# Patient Record
Sex: FEMALE | Race: WHITE | Employment: FULL TIME | URBAN - METROPOLITAN AREA
[De-identification: names, ages, dates, MRNs, and addresses within clinical notes are randomized per-mention and may not be internally consistent; named-entity substitution may affect disease eponyms.]

---

## 2017-01-09 ENCOUNTER — OFFICE VISIT (OUTPATIENT)
Dept: FAMILY MEDICINE CLINIC | Age: 27
End: 2017-01-09

## 2017-01-09 VITALS
HEIGHT: 69 IN | SYSTOLIC BLOOD PRESSURE: 122 MMHG | TEMPERATURE: 97.6 F | BODY MASS INDEX: 39.81 KG/M2 | RESPIRATION RATE: 17 BRPM | DIASTOLIC BLOOD PRESSURE: 83 MMHG | HEART RATE: 78 BPM | OXYGEN SATURATION: 99 % | WEIGHT: 268.8 LBS

## 2017-01-09 DIAGNOSIS — G43.809 OTHER MIGRAINE WITHOUT STATUS MIGRAINOSUS, NOT INTRACTABLE: Chronic | ICD-10-CM

## 2017-01-09 DIAGNOSIS — J18.9 PNEUMONIA OF LEFT UPPER LOBE DUE TO INFECTIOUS ORGANISM: Primary | ICD-10-CM

## 2017-01-09 RX ORDER — SUMATRIPTAN 100 MG/1
TABLET, FILM COATED ORAL
Qty: 12 TAB | Refills: 5 | Status: SHIPPED | OUTPATIENT
Start: 2017-01-09 | End: 2017-10-18 | Stop reason: SDUPTHER

## 2017-01-09 NOTE — PROGRESS NOTES
PRE-VISIT PLANNING:     Future Appointments  Date Time Provider Barb Harris   2017 2:30 PM Idalmis Chavarria  [facility] Jorge Son is a patient of Idalmis Chavarria MD who is scheduled for an office visit with Idalmis Chavarria MD  for follow up on possible pneumonia, noted on CXR 16 with left upper lobe nodules suspicious for developing PNA. Encounter opened prior to visit to complete pre-visit planning, update and review pertinent sections in the chart. Last office visit with PCP was 2016. Internal Medicine/Primary Care  Progress Note  [employer] [facility] [place] [address] [address], [address], [address] [facility] [place]  Phone [phone]  Fax [phone]    Date of Service:  2017  Patient's Name: Concetta Eid   Patient's :  1990     History, Discussion & Plan     Chief Complaint   Patient presents with    Pneumonia     Follow up        Concetta Eid is a pleasant 32 y.o. female patient who was seen today for follow up visit. She  has a past medical history of BMI 33.0-33.9,adult (14); Depression; H/O echocardiogram (14); Headache(784.0); seizure disorder (18 month old); Left facial numbness (2014); Obstructive sleep apnea (adult) (pediatric); and Posttraumatic stress disorder. Here today with her  and son. Doing better, breathing is back to baseline, cough is mostly resolved. Reviewed recent CXR results and discussed plan for updating PPSV23 in 5 years.       Review of Systems (positives in bold)   CONST:   fevers, chills, rigors, malaise, night sweats, fatigue    unintentional weight change, appetite change  NEURO:   headaches, auras, vision changes, seizures,     dizziness, lightheadeness, orthostasis, loss of consciousness, confusion    numbness/tingling/sensory change, focal weakness, new gait difficulty/imbalance  HEENT:  itchy eyes, runny eyes, red eyes, eye watering or discharge, vision changes, light sensitivity, double vision    ear pain, ear pressure/popping, ear discharge/drainage, hearing change    sneezing, runny nose, nasal congestion, postnasal drip,     nosebleeds, altered sense of smell    sore throat, dry mouth,voice change, hoarse voice,      jaw pain, jaw popping, toothache, dysgeusia    difficulty swallowing, oral ulcers or canker sores  CV:      chest pain, palpitations, chest wall pain, orthopnea, PND, edema  PULM:  SOB, wheezing, cough, sputum production, hemoptysis, recurrent pneumonia    Body mass index is 39.69 kg/(m^2). Discussed the patient's above normal BMI with her. I have recommended the following interventions and follow up:  Weight loss from baseline weight. 30 minutes of cardiovascular exercise daily, reduction in simple carbohydrates, increase in dietary fiber, adequate water intake to stay hydrated/keep urine clear to light yellow. Follow up at next OV. Diagnoses & Orders:   Jocelyne Barajas was seen today for follow up. Plan to update CXR in several weeks to eval for clearance. Imitrex for migraine type headaches. ICD-10-CM ICD-9-CM    1. Pneumonia of left upper lobe due to infectious organism J18.9 483.8 XR CHEST PA LAT   2. Other migraine without status migrainosus, not intractable G43.809  SUMAtriptan (IMITREX) 100 mg tablet       The patient states understanding/agreement with the treatment plan. All questions were answered.       Raymond Bhakta MD - Internal Medicine  1/16/2017, 5:24 PM    Patient Care Team:  Raymond Bhakta MD as PCP - General (Internal Medicine)  Zack Camacho MD as Consulting Provider (Sleep Medicine)  Kyle Ray MD as Consulting Provider (Neurology)  Baltazar Plummer MD (General Surgery)      Objective:       VS:    Visit Vitals    /83 (BP 1 Location: Right arm, BP Patient Position: Sitting)    Pulse 78    Temp 97.6 °F (36.4 °C) (Oral)    Resp 17    Ht 5' 9\" (1.753 m)    Wt 268 lb 12.8 oz (121.9 kg)    LMP 01/04/2017    SpO2 99%    BMI 39.69 kg/m2       General:   Age appropriate female, seated comfortably in exam room chair    Appears well, well-nourished, well-groomed, conversant, alert, in no acute distress. HEENT:  Normocephalic, atraumatic, MMM, PERRL, EOMI   Cardiovasc:   Regular rate and rhythm, no murmurs, no rubs, no gallops  Pulmonary:   Clear breath sounds bilaterally, good air movement,    No wheezing, no rales, no rhonchi, normal respiratory effort  Neuro:   Alert, conversant, following commands, no focal deficits. Gait is narrow based and stable without assistive device  Skin:    No rashes noted. Additional History     Past Medical History   Diagnosis Date    BMI 33.0-33.9,adult 1/2/14    Depression     H/O echocardiogram 12/5/14     Normal ([facility]), negative bubble study    Headache(784.0)     Hx of seizure disorder 24month old     Seizures stopped after bilateral ureteral transplants    Left facial numbness 11/8/2014    Obstructive sleep apnea (adult) (pediatric)      Mild, AHI 8    Posttraumatic stress disorder      Past Surgical History   Procedure Laterality Date    Hx other surgical Bilateral 1992     Bilateral ureteral transplants done at < 2 years     Social History   Substance Use Topics    Smoking status: Former Smoker    Smokeless tobacco: Never Used    Alcohol use Yes      Comment: on occasion     Current Outpatient Prescriptions   Medication Sig    SUMAtriptan (IMITREX) 100 mg tablet TAKE 1 TABLET BY MOUTH ONCE AS NEEDED FOR MIGRAINE FOR UP TO 1 DOSE.  sertraline (ZOLOFT) 100 mg tablet TAKE 2 TABLETS BY MOUTH DAILY.  Nebulizer & Compressor machine Wuse nebulizer with albuterol solution up to every 4 hours if needed for cough, wheezing    albuterol (PROVENTIL VENTOLIN) 2.5 mg /3 mL (0.083 %) nebulizer solution Use with nebulizer up to every 4 hours as needed    albuterol (PROAIR HFA) 90 mcg/actuation inhaler Take  by inhalation.     acetaminophen (TYLENOL) 325 mg tablet Take  by mouth every four (4) hours as needed for Pain.  Biotin 2,500 mcg cap Take  by mouth.  cholecalciferol, VITAMIN D3, (VITAMIN D3) 5,000 unit tab tablet Take  by mouth daily.  PNV#16-Iron Fum & PS-FA-OM-3 35-1-200 mg cap Take  by mouth.  verapamil SR (CALAN-SR) 240 mg CR tablet Take  by mouth nightly.  riboflavin, vitamin B2, 100 mg tablet Take 400 mg by mouth daily. No current facility-administered medications for this visit. Allergies   Allergen Reactions    Gluten Other (comments)     migraine    Corn Hives    Naproxen Hives     Tolerates ibuprofen    Strawberry Hives            This document may have been created with the aid of dictation software. In spite of review and editing, text may contain errors, particularly phonetic errors.

## 2017-01-09 NOTE — PROGRESS NOTES
1. Have you been to the ER, urgent care clinic since your last visit? Hospitalized since your last visit? No    2. Have you seen or consulted any other health care providers outside of the [address] [place] [address] since your last visit? Include any pap smears or colon screening.  Yes When: BONITA HUNTLEY 12/21/2016

## 2017-04-03 RX ORDER — SERTRALINE HYDROCHLORIDE 100 MG/1
TABLET, FILM COATED ORAL
Qty: 60 TAB | Refills: 2 | Status: SHIPPED | OUTPATIENT
Start: 2017-04-03 | End: 2017-06-27 | Stop reason: SDUPTHER

## 2017-04-18 NOTE — PROGRESS NOTES
PRE-VISIT PLANNING:     Future Appointments  Date Time Provider Barb Harris   2017 8:15 AM Kari Arellano MD [address] Johnston Nissen (PCP is Kari Arellano MD) is scheduled for an office visit with Kari Arellano MD on 2017 for follow up PNA SHOSHANA. Encounter opened prior to visit to complete pre-visit planning, update and review pertinent sections in the chart. Last office visit with PCP was 2017. Rooming Nurse Items:  -- Offer TDAP and HPV vaccines    Pending items for provider to review with patient:  -- Needs to complete SHOSHANA CXR (order placed in )  Health Maintenance Due   Topic Date Due    HPV AGE 9Y-34Y (1 of 3 - Female 3 Dose Series) 2001    DTaP/Tdap/Td series (1 - Tdap) 2011          Internal Medicine/Primary Care  Progress Note  [address] [place]  [address], [facility] [place] [address]  Phone [phone]  Fax [phone]    Date of Service:  2017  Patient's Name: Gurdeep Stauffer   Patient's :  1990     History, Discussion & Plan     Chief Complaint   Patient presents with    Depression    Immunization/Injection     HPV        Megan Johnston Nissen is a pleasant 32 y.o. female patient who presented today for follow up. She  has a past medical history of BMI 33.0-33.9,adult (14); Depression; H/O echocardiogram (14); Headache; seizure disorder (18 month old); Left facial numbness (2014); Obstructive sleep apnea (adult) (pediatric); and Posttraumatic stress disorder. Today patient reports she has been on abx for strep twice this spring, currently still on abx for strep, so she hasn't completed her CXR yet. No cough, sore throat is much better. Works in healthcare field. Having migraines frequently which don't follow a pattern and noting some decrease in efficacy of imitrex. Hasn't tried taking triptan with nausea medication yet.   Interested in trying an alternative triptan so she can alternate meds. Noting some nausea between migraines, but states she is definitely not pregnant. REVIEW OF SYSTEMS       Complete review of systems negative except as noted in HPI. Diagnoses & Orders:   Paula Swartz was seen today for follow up on hx of pneumonia, depression, migraines. Can try alternating zomig with imitrex and can use phenergan with triptan (or alone if having isolated nausea). Fasting today, pt will update labs and complete CXR. She will return for vaccines at a later date as she is still on tx for strep. Follow up in 6 months for 30 min visit, sooner PRN. ICD-10-CM ICD-9-CM    1. Migraine without aura and without status migrainosus, not intractable G43.009 346.10 gabapentin (NEURONTIN) 300 mg capsule      ZOLMitriptan (ZOMIG) 5 mg tablet      promethazine (PHENERGAN) 25 mg tablet   2. Nausea R11.0 787.02 gabapentin (NEURONTIN) 300 mg capsule      ZOLMitriptan (ZOMIG) 5 mg tablet      promethazine (PHENERGAN) 25 mg tablet   3. Screening for endocrine, nutritional, metabolic and immunity disorder Z13.29 V77.99 CBC WITH AUTOMATED DIFF    I04.69  METABOLIC PANEL, COMPREHENSIVE    Z13.228  HEMOGLOBIN A1C WITH EAG    Z13.0  LIPID PANEL      TSH AND FREE T4      URINALYSIS W/ RFLX MICROSCOPIC      VITAMIN D, 25 HYDROXY   4. Screening for thyroid disorder Z13.29 V77.0 TSH AND FREE T4   5. Screening for blood or protein in urine Z13.89 V82.9 URINALYSIS W/ RFLX MICROSCOPIC   6. Screening, lipid Z13.220 V77.91 LIPID PANEL   7. Screening for diabetes mellitus Z13.1 V77.1 HEMOGLOBIN A1C WITH EAG   8. Encounter for vitamin deficiency screening Z13.21 V77.99 VITAMIN D, 25 HYDROXY       The patient states understanding/agreement with the treatment plan. All questions were answered.       Adrián Dean MD - Internal Medicine  4/20/2017, 8:07 AM    Patient Care Team:  Adrián Dean MD as PCP - General (Internal Medicine)  Lana Cabrera MD as Consulting Provider (Sleep Medicine)  Candance Meek, MD as Consulting Provider (Neurology)  Diana Calero MD (General Surgery)      Objective:       VS:    Visit Vitals    /69 (BP 1 Location: Right arm, BP Patient Position: Sitting)    Pulse 84    Temp 97.7 °F (36.5 °C) (Oral)    Resp 20    Ht 5' 9\" (1.753 m)    Wt 255 lb (115.7 kg)    LMP 04/14/2017    SpO2 97%    BMI 37.66 kg/m2       General:   Age appropriate female, seated comfortably in exam room chair    Appears well, well-nourished, well-groomed, conversant, alert, in no acute distress. HEENT:  Normocephalic, atraumatic, MMM, PERRL, EOMI   Cardiovasc:   Regular rate and rhythm, no murmurs, no rubs, no gallops  Pulmonary:   Clear breath sounds bilaterally, good air movement,    No wheezing, no rales, no rhonchi, normal respiratory effort  Abdomen:   Abdomen soft, nontender, nondistended, NABS, no masses  Extremities:   No pitting dependent edema    No tenderness with palpation of calves    Warm and well-perfused at distal extremities  Neuro:   Alert, conversant, following commands, no focal deficits. Gait is narrow based and stable without assistive device  Skin:    No rashes noted.     Psych:  Affect is bright and interactive, facies and mood are congruent,     behavior normal and appropriate, thought process linear and logical     Memory appears to be normal throughout interview      Additional History     Past Medical History:   Diagnosis Date    BMI 33.0-33.9,adult 1/2/14    Depression     H/O echocardiogram 12/5/14    Normal ([facility]), negative bubble study    Headache     Hx of seizure disorder 24month old    Seizures stopped after bilateral ureteral transplants    Left facial numbness 11/8/2014    Obstructive sleep apnea (adult) (pediatric)     Mild, AHI 8    Posttraumatic stress disorder      Past Surgical History:   Procedure Laterality Date    HX OTHER SURGICAL Bilateral 1992    Bilateral ureteral transplants done at < 2 years     Social History   Substance Use Topics    Smoking status: Former Smoker    Smokeless tobacco: Never Used    Alcohol use Yes      Comment: on occasion     Current Outpatient Prescriptions   Medication Sig    gabapentin (NEURONTIN) 300 mg capsule Take 300 mg by mouth three (3) times daily.  sertraline (ZOLOFT) 100 mg tablet TAKE 2 TABLETS BY MOUTH DAILY.  SUMAtriptan (IMITREX) 100 mg tablet TAKE 1 TABLET BY MOUTH ONCE AS NEEDED FOR MIGRAINE FOR UP TO 1 DOSE.  Nebulizer & Compressor machine Wuse nebulizer with albuterol solution up to every 4 hours if needed for cough, wheezing    albuterol (PROVENTIL VENTOLIN) 2.5 mg /3 mL (0.083 %) nebulizer solution Use with nebulizer up to every 4 hours as needed    albuterol (PROAIR HFA) 90 mcg/actuation inhaler Take  by inhalation.  acetaminophen (TYLENOL) 325 mg tablet Take  by mouth every four (4) hours as needed for Pain.  Biotin 2,500 mcg cap Take  by mouth.  cholecalciferol, VITAMIN D3, (VITAMIN D3) 5,000 unit tab tablet Take  by mouth daily.  PNV#16-Iron Fum & PS-FA-OM-3 35-1-200 mg cap Take  by mouth.  riboflavin, vitamin B2, 100 mg tablet Take 400 mg by mouth daily.  verapamil SR (CALAN-SR) 240 mg CR tablet Take  by mouth nightly. No current facility-administered medications for this visit. Allergies   Allergen Reactions    Gluten Other (comments)     migraine    Corn Hives    Naproxen Hives     Tolerates ibuprofen    Strawberry Hives            This document may have been created with the aid of dictation software. In spite of review and editing, text may contain errors, particularly phonetic errors.

## 2017-04-20 ENCOUNTER — OFFICE VISIT (OUTPATIENT)
Dept: FAMILY MEDICINE CLINIC | Age: 27
End: 2017-04-20

## 2017-04-20 VITALS
RESPIRATION RATE: 20 BRPM | WEIGHT: 255 LBS | OXYGEN SATURATION: 97 % | BODY MASS INDEX: 37.77 KG/M2 | HEIGHT: 69 IN | SYSTOLIC BLOOD PRESSURE: 126 MMHG | HEART RATE: 84 BPM | TEMPERATURE: 97.7 F | DIASTOLIC BLOOD PRESSURE: 69 MMHG

## 2017-04-20 DIAGNOSIS — Z13.89 SCREENING FOR BLOOD OR PROTEIN IN URINE: ICD-10-CM

## 2017-04-20 DIAGNOSIS — Z13.29 SCREENING FOR THYROID DISORDER: ICD-10-CM

## 2017-04-20 DIAGNOSIS — Z13.228 SCREENING FOR ENDOCRINE, NUTRITIONAL, METABOLIC AND IMMUNITY DISORDER: ICD-10-CM

## 2017-04-20 DIAGNOSIS — Z13.29 SCREENING FOR ENDOCRINE, NUTRITIONAL, METABOLIC AND IMMUNITY DISORDER: ICD-10-CM

## 2017-04-20 DIAGNOSIS — Z13.21 SCREENING FOR ENDOCRINE, NUTRITIONAL, METABOLIC AND IMMUNITY DISORDER: ICD-10-CM

## 2017-04-20 DIAGNOSIS — Z13.0 SCREENING FOR ENDOCRINE, NUTRITIONAL, METABOLIC AND IMMUNITY DISORDER: ICD-10-CM

## 2017-04-20 DIAGNOSIS — B99.9 RECURRENT INFECTIONS: ICD-10-CM

## 2017-04-20 DIAGNOSIS — Z13.1 SCREENING FOR DIABETES MELLITUS: ICD-10-CM

## 2017-04-20 DIAGNOSIS — R11.0 NAUSEA: ICD-10-CM

## 2017-04-20 DIAGNOSIS — Z13.21 ENCOUNTER FOR VITAMIN DEFICIENCY SCREENING: ICD-10-CM

## 2017-04-20 DIAGNOSIS — G43.009 MIGRAINE WITHOUT AURA AND WITHOUT STATUS MIGRAINOSUS, NOT INTRACTABLE: Primary | Chronic | ICD-10-CM

## 2017-04-20 DIAGNOSIS — Z13.220 SCREENING, LIPID: ICD-10-CM

## 2017-04-20 LAB
25(OH)D3 SERPL-MCNC: 57.3 NG/ML (ref 32–100)
A-G RATIO,AGRAT: 1.5 RATIO (ref 1.1–2.6)
ABSOLUTE LYMPHOCYTE COUNT, 10803: 1.8 K/UL (ref 1–4.8)
ALBUMIN SERPL-MCNC: 4.6 G/DL (ref 3.5–5)
ALP SERPL-CCNC: 61 U/L (ref 25–115)
ALT SERPL-CCNC: 19 U/L (ref 5–40)
ANION GAP SERPL CALC-SCNC: 19 MMOL/L
AST SERPL W P-5'-P-CCNC: 22 U/L (ref 10–37)
AVG GLU, 10930: 104 MG/DL (ref 91–123)
BASOPHILS # BLD: 0 K/UL (ref 0–0.2)
BASOPHILS NFR BLD: 0 % (ref 0–2)
BILIRUB SERPL-MCNC: 0.2 MG/DL (ref 0.2–1.2)
BILIRUB UR QL: NEGATIVE
BUN SERPL-MCNC: 15 MG/DL (ref 6–22)
CALCIUM SERPL-MCNC: 9.4 MG/DL (ref 8.4–10.5)
CHLORIDE SERPL-SCNC: 98 MMOL/L (ref 98–110)
CHOLEST SERPL-MCNC: 209 MG/DL (ref 110–200)
CO2 SERPL-SCNC: 23 MMOL/L (ref 20–32)
CREAT SERPL-MCNC: 0.7 MG/DL (ref 0.5–1.2)
EOSINOPHIL # BLD: 0.2 K/UL (ref 0–0.5)
EOSINOPHIL NFR BLD: 3 % (ref 0–6)
EPITHELIAL,EPSU: NORMAL /HPF (ref 0–2)
ERYTHROCYTE [DISTWIDTH] IN BLOOD BY AUTOMATED COUNT: 14 % (ref 10–16)
GFRAA, 66117: >60
GFRNA, 66118: >60
GLOBULIN,GLOB: 3 G/DL (ref 2–4)
GLUCOSE SERPL-MCNC: 87 MG/DL (ref 65–99)
GLUCOSE UR QL: NEGATIVE MG/DL
GRANULOCYTES,GRANS: 66 % (ref 40–75)
HBA1C MFR BLD HPLC: 5.2 % (ref 4.8–5.9)
HCT VFR BLD AUTO: 42.5 % (ref 35.1–46.5)
HDLC SERPL-MCNC: 67 MG/DL (ref 40–59)
HGB BLD-MCNC: 13.6 G/DL (ref 11.7–15.5)
HGB UR QL STRIP: NEGATIVE
KETONES UR QL STRIP.AUTO: NEGATIVE MG/DL
LDLC SERPL CALC-MCNC: 126 MG/DL (ref 50–99)
LEUKOCYTE ESTERASE: NEGATIVE
LYMPHOCYTES, LYMLT: 26 % (ref 27–45)
MCH RBC QN AUTO: 29 PG (ref 26–34)
MCHC RBC AUTO-ENTMCNC: 32 G/DL (ref 32–36)
MCV RBC AUTO: 91 FL (ref 80–95)
MONOCYTES # BLD: 0.3 K/UL (ref 0.1–0.9)
MONOCYTES NFR BLD: 4 % (ref 3–9)
NEUTROPHILS # BLD AUTO: 4.6 K/UL (ref 1.8–7.7)
NITRITE UR QL STRIP.AUTO: NEGATIVE
PH UR STRIP: 6 PH (ref 5–8)
PLATELET # BLD AUTO: 298 K/UL (ref 140–440)
PMV BLD AUTO: 10.3 FL (ref 6–10.8)
POTASSIUM SERPL-SCNC: 4.2 MMOL/L (ref 3.5–5.5)
PROT SERPL-MCNC: 7.6 G/DL (ref 6.4–8.3)
PROT UR QL STRIP: NEGATIVE MG/DL
RBC # BLD AUTO: 4.65 M/UL (ref 3.8–5.2)
RBC #/AREA URNS HPF: NEGATIVE /HPF
SODIUM SERPL-SCNC: 140 MMOL/L (ref 133–145)
SP GR UR: 1.02 (ref 1–1.03)
T4 FREE SERPL-MCNC: 1 NG/DL (ref 0.9–1.8)
TRIGL SERPL-MCNC: 80 MG/DL (ref 40–149)
TSH SERPL DL<=0.005 MIU/L-ACNC: 1.01 MCU/ML (ref 0.27–4.2)
UROBILINOGEN UR STRIP-MCNC: <2 MG/DL
VLDLC SERPL CALC-MCNC: 16 MG/DL (ref 8–30)
WBC # BLD AUTO: 7 K/UL (ref 4–11)
WBC URNS QL MICRO: NORMAL /HPF (ref 0–2)

## 2017-04-20 RX ORDER — PROMETHAZINE HYDROCHLORIDE 25 MG/1
25 TABLET ORAL
Qty: 20 TAB | Refills: 5 | Status: SHIPPED | OUTPATIENT
Start: 2017-04-20 | End: 2020-09-01

## 2017-04-20 RX ORDER — GABAPENTIN 300 MG/1
300 CAPSULE ORAL 3 TIMES DAILY
COMMUNITY
End: 2019-08-15

## 2017-04-20 RX ORDER — ZOLMITRIPTAN 5 MG/1
5 TABLET, FILM COATED ORAL AS NEEDED
Qty: 12 TAB | Refills: 5 | Status: SHIPPED | OUTPATIENT
Start: 2017-04-20 | End: 2017-04-28

## 2017-04-20 NOTE — PROGRESS NOTES
1. Have you been to the ER, urgent care clinic since your last visit? Hospitalized since your last visit? No    2. Have you seen or consulted any other health care providers outside of the [address] [place] [address] since your last visit? Include any pap smears or colon screening.  No

## 2017-04-20 NOTE — MR AVS SNAPSHOT
Visit Information Date & Time Provider Department Dept. Phone Encounter #  
 4/20/2017  8:15 AM Jameel Manuel MD 3 [facility] [phone] [id number] Follow-up Instructions Return in about 6 months (around 10/20/2017) for 30 min visit. Upcoming Health Maintenance Date Due  
 HPV AGE 9Y-34Y (1 of 3 - Female 3 Dose Series) 7/13/2001 DTaP/Tdap/Td series (1 - Tdap) 7/13/2011 PAP AKA CERVICAL CYTOLOGY 4/16/2018 Allergies as of 4/20/2017  Review Complete On: 4/20/2017 By: Jameel Manuel MD  
  
 Severity Noted Reaction Type Reactions Gluten High 07/18/2014   Intolerance Other (comments)  
 migraine Corn  03/15/2013    Hives Naproxen  03/15/2013    Hives Tolerates ibuprofen [place]  03/15/2013    Hives Current Immunizations  Reviewed on 4/18/2017 Name Date Influenza Vaccine 9/23/2016, 9/23/2016, 11/8/2014 Pneumococcal Polysaccharide (PPSV-23) 10/12/2016 TB Skin Test (PPD) Intradermal 1/6/2014 Reviewed by Jameel Manuel MD on 4/18/2017 at  8:07 AM  
You Were Diagnosed With   
  
 Codes Comments Migraine without aura and without status migrainosus, not intractable    -  Primary ICD-10-CM: G43.009 ICD-9-CM: 346.10 Nausea     ICD-10-CM: R11.0 ICD-9-CM: 787.02 Screening for endocrine, nutritional, metabolic and immunity disorder     ICD-10-CM: Z13.29, Z13.21, Z13.228, Z13.0 ICD-9-CM: V77.99 Screening for thyroid disorder     ICD-10-CM: Z13.29 ICD-9-CM: V77.0 Screening for blood or protein in urine     ICD-10-CM: Z13.89 ICD-9-CM: V82.9 Screening, lipid     ICD-10-CM: Y06.759 ICD-9-CM: V77.91 Screening for diabetes mellitus     ICD-10-CM: Z13.1 ICD-9-CM: V77.1 Encounter for vitamin deficiency screening     ICD-10-CM: Z13.21 ICD-9-CM: V77.99 Vitals BP Pulse Temp Resp Height(growth percentile) Weight(growth percentile) 126/69 (BP 1 Location: Right arm, BP Patient Position: Sitting) 84 97.7 °F (36.5 °C) (Oral) 20 5' 9\" (1.753 m) 255 lb (115.7 kg) LMP SpO2 BMI OB Status Smoking Status 04/14/2017 97% 37.66 kg/m2 Having regular periods Former Smoker Vitals History BMI and BSA Data Body Mass Index Body Surface Area  
 37.66 kg/m 2 2.37 m 2 Preferred Pharmacy Pharmacy Name Phone [facility] [phone] [facility] [phone] Your Updated Medication List  
  
   
This list is accurate as of: 4/20/17  8:43 AM.  Always use your most recent med list.  
  
  
  
  
 Biotin 2,500 mcg Cap Take  by mouth. cholecalciferol (VITAMIN D3) 5,000 unit Tab tablet Commonly known as:  VITAMIN D3 Take  by mouth daily. gabapentin 300 mg capsule Commonly known as:  NEURONTIN Take 300 mg by mouth three (3) times daily. Nebulizer & Compressor machine Wuse nebulizer with albuterol solution up to every 4 hours if needed for cough, wheezing PNV#16-Iron Fum & PS-FA-OM-3 35-1-200 mg Cap Take  by mouth. * PROAIR HFA 90 mcg/actuation inhaler Generic drug:  albuterol Take  by inhalation. * albuterol 2.5 mg /3 mL (0.083 %) nebulizer solution Commonly known as:  PROVENTIL VENTOLIN Use with nebulizer up to every 4 hours as needed  
  
 promethazine 25 mg tablet Commonly known as:  PHENERGAN Take 1 Tab by mouth every eight (8) hours as needed for Nausea. riboflavin (vitamin B2) 100 mg tablet Take 400 mg by mouth daily. sertraline 100 mg tablet Commonly known as:  ZOLOFT  
TAKE 2 TABLETS BY MOUTH DAILY. SUMAtriptan 100 mg tablet Commonly known as:  IMITREX  
TAKE 1 TABLET BY MOUTH ONCE AS NEEDED FOR MIGRAINE FOR UP TO 1 DOSE. TYLENOL 325 mg tablet Generic drug:  acetaminophen Take  by mouth every four (4) hours as needed for Pain.  
  
 verapamil  mg CR tablet Commonly known as:  CALAN-SR  
 Take  by mouth nightly. ZOLMitriptan 5 mg tablet Commonly known as:  ZOMIG Take 1 Tab by mouth as needed for Migraine. * Notice: This list has 2 medication(s) that are the same as other medications prescribed for you. Read the directions carefully, and ask your doctor or other care provider to review them with you. Prescriptions Sent to Pharmacy Refills ZOLMitriptan (ZOMIG) 5 mg tablet 5 Sig: Take 1 Tab by mouth as needed for Migraine. Class: Normal  
 Pharmacy: [facility]/pharmacy #[id number] [address] Ph #: [phone] Route: Oral  
 promethazine (PHENERGAN) 25 mg tablet 5 Sig: Take 1 Tab by mouth every eight (8) hours as needed for Nausea. Class: Normal  
 Pharmacy: [facility]/pharmacy #[id number] - [address] Ph #: [phone] Route: Oral  
  
Follow-up Instructions Return in about 6 months (around 10/20/2017) for 30 min visit. To-Do List   
 04/20/2017 Lab:  CBC WITH AUTOMATED DIFF   
  
 04/20/2017 Lab:  HEMOGLOBIN A1C WITH EAG   
  
 04/20/2017 Lab:  LIPID PANEL   
  
 04/20/2017 Lab:  METABOLIC PANEL, COMPREHENSIVE   
  
 04/20/2017 Lab:  TSH AND FREE T4   
  
 04/20/2017 Lab:  URINALYSIS W/ RFLX MICROSCOPIC   
  
 04/20/2017 Lab:  VITAMIN D, 25 HYDROXY Patient Instructions STAY UP TO DATE WITH YOUR PREVENTIVE HEALTH:    
Please review the following recommendations and if you are not sure that your healthcare is up to date, ask your provider at your next scheduled office visit. -- Yearly preventive visits (sometimes called \"physicals\") are recommended for all adults. Medicare and Medicaid do not pay for physicals. Medicare covers a yearly wellness visit that differs in many ways from a traditional physical, but is an opportunity to make sure you are maximizing the preventive services that will be covered by Medicare.   Each insurance carrier will have different regulations about what services may be covered, so be sure to talk with your insurance provider before scheduling a visit. -- Colon cancer screening is recommended for all patients 48 and older with either colonoscopy (interval will vary depending on results) or yearly stool testing.   
 
-- Mammogram is recommended every 2 years for women ages 36 to 76. -- Pap smear is recommended every 3-5 years for women aged 24 to 72, unless your cervix has been surgically removed for benign reasons. -- Flu shot is recommended every fall for adults of all ages. -- Prevnar 15 is recommended at the age of 72 for all adults. -- Pneumovax is recommended one year after Prevnar 13 for all adults, but earlier if you have a history of chronic health problems (including diabetes and asthma) or smoking. -- Tetanus boosters are recommended every 10 years for adults of all ages. Medicare and Medicaid do not cover tetanus as a preventive booster, but will pay for patients to receive a booster in the event of certain injuries. INSTRUCTIONS AFTER YOUR VISIT ON 4/20/2017  
 
-- LAB WORK was ordered for today. Sign in for the lab at the . Results are generally reviewed within 10-14 days. -- X-RAYS were ordered today. The  will direct you to our X-ray suite. The radiologist will provide a report in 24-48 hours. If you want a copy of your images, you can request a CD copy from the X-ray technician. TESTING RESULTS  
-- Lab results may become available for your review on [employer] before your provider has an opportunity to write you a note about results. Review of routine lab results will generally be done in 10-14 days. Please save your inquiries about results until your provider has had time to review them.     
 
-- If you have testing done outside of the office, please call to let us know the date and location that your testing was completed. Results can often be obtained faster if an inquiry is run. MEDICATION REFILLS   
 
-- Controlled substance refills (medications that require printed prescriptions for monitoring of use per [facility] guidelines) must be picked up in the office and per medical group policy will require a scheduled office visit with the provider. Calling the after hours answering service to request a controlled substance represents a violation of [facility] controlled substance policy. -- All other medication refills are to be requested by calling your pharmacy during regular office hours. The after hours physician on call is not required to respond to calls about medication refills. It is your responsibility to keep track of when you may be running out of medication and to place refill requests at least 3 business days prior. [facility] Scheduling appointments can be done at the  or by calling [phone] and selecting option #1. HOLIDAY CLOSURES:  
 
The office is closed on six major holidays each year:  New Year's Day, July 4th, Memorial Day, Labor Day, Thanksgiving, and Wilson Day. Lab and X-ray services are not available on those days. Introducing [facility]! Dear Kristine Parrish: Thank you for requesting a [email or web address] account. Our records indicate that you already have an active [email or web address] account. You can access your account anytime at [email or web address] Did you know that you can access your hospital and ER discharge instructions at any time in [email or web address]? You can also review all of your test results from your hospital stay or ER visit. Additional Information If you have questions, please visit the Frequently Asked Questions section of the [email or web address] website at [email or web address] Remember, [email or web address] is NOT to be used for urgent needs. For medical emergencies, dial 911. Now available from your iPhone and Android! Please provide this summary of care documentation to your next provider. Your primary care clinician is listed as Shirley Alexander. If you have any questions after today's visit, please call [phone].

## 2017-04-22 PROBLEM — E78.9 BORDERLINE HIGH CHOLESTEROL: Status: ACTIVE | Noted: 2017-04-20

## 2017-04-22 NOTE — PROGRESS NOTES
Live Calendarst message to patient regarding results (see below) has been generated. Blood counts, kidney, electrolyte, liver, fasting blood sugar, average blood sugar, thyroid, urine screening and vitamin D levels are normal.   Cholesterol levels have gone up significantly - LDL \"bad\" cholesterol is up to 126, which is slightly above normal (previous readings were 90 and 66). HDL \"good\" cholesterol level remains in protective range > 60. Low cardiovascular risk score, no medication indicated. Healthy lifestyle changes recommended including daily exercise (30 minutes minimum) and reduction in simple carbohydrates and reduction in saturated fats.

## 2017-04-26 ENCOUNTER — PATIENT MESSAGE (OUTPATIENT)
Dept: FAMILY MEDICINE CLINIC | Age: 27
End: 2017-04-26

## 2017-04-26 NOTE — TELEPHONE ENCOUNTER
From: Kim Going  To: Khushbu Jung MD  Sent: 4/26/2017 9:48 AM EDT  Subject: Visit Follow-Up Question    Dr Vance Ashby,    I know I just saw you last week as we went over my medications, I had stated I had been on antibiotics 2x in 2017 alone for Strep throat. I now have it yet again. I also tried the new migraine medication and that did not help the migraine at all. Just wanting to keep you updated.      [address]

## 2017-04-28 DIAGNOSIS — G43.009 MIGRAINE WITHOUT AURA AND WITHOUT STATUS MIGRAINOSUS, NOT INTRACTABLE: Primary | Chronic | ICD-10-CM

## 2017-04-28 RX ORDER — RIZATRIPTAN BENZOATE 10 MG/1
10 TABLET, ORALLY DISINTEGRATING ORAL
Qty: 12 TAB | Refills: 1 | Status: SHIPPED | OUTPATIENT
Start: 2017-04-28 | End: 2017-11-28 | Stop reason: SDUPTHER

## 2017-05-25 ENCOUNTER — HOSPITAL ENCOUNTER (OUTPATIENT)
Dept: GENERAL RADIOLOGY | Age: 27
Discharge: HOME | End: 2017-05-25
Payer: COMMERCIAL

## 2017-05-25 DIAGNOSIS — R05.9 COUGH: ICD-10-CM

## 2017-05-25 PROCEDURE — 71020 XR CHEST PA LAT: CPT

## 2017-07-13 ENCOUNTER — TELEPHONE (OUTPATIENT)
Dept: FAMILY MEDICINE CLINIC | Age: 27
End: 2017-07-13

## 2017-07-13 NOTE — TELEPHONE ENCOUNTER
Patient called and l/m stating need prior authorization. Calls made to patient no answer l/m stating we need more info.

## 2017-10-18 DIAGNOSIS — G43.809 OTHER MIGRAINE WITHOUT STATUS MIGRAINOSUS, NOT INTRACTABLE: Chronic | ICD-10-CM

## 2017-10-19 RX ORDER — SUMATRIPTAN 100 MG/1
TABLET, FILM COATED ORAL
Qty: 12 TAB | Refills: 5 | Status: SHIPPED | OUTPATIENT
Start: 2017-10-19 | End: 2017-12-19 | Stop reason: ALTCHOICE

## 2017-11-28 DIAGNOSIS — G43.009 MIGRAINE WITHOUT AURA AND WITHOUT STATUS MIGRAINOSUS, NOT INTRACTABLE: Chronic | ICD-10-CM

## 2017-11-28 RX ORDER — RIZATRIPTAN BENZOATE 10 MG/1
TABLET, ORALLY DISINTEGRATING ORAL
Qty: 12 TAB | Refills: 1 | Status: SHIPPED | OUTPATIENT
Start: 2017-11-28 | End: 2017-12-19 | Stop reason: ALTCHOICE

## 2017-12-19 ENCOUNTER — OFFICE VISIT (OUTPATIENT)
Dept: FAMILY MEDICINE CLINIC | Age: 27
End: 2017-12-19

## 2017-12-19 VITALS
WEIGHT: 279.6 LBS | RESPIRATION RATE: 18 BRPM | HEIGHT: 69 IN | OXYGEN SATURATION: 97 % | HEART RATE: 84 BPM | TEMPERATURE: 97.8 F | BODY MASS INDEX: 41.41 KG/M2 | SYSTOLIC BLOOD PRESSURE: 130 MMHG | DIASTOLIC BLOOD PRESSURE: 83 MMHG

## 2017-12-19 DIAGNOSIS — G43.009 MIGRAINE WITHOUT AURA AND WITHOUT STATUS MIGRAINOSUS, NOT INTRACTABLE: Primary | Chronic | ICD-10-CM

## 2017-12-19 DIAGNOSIS — F32.A CHRONIC DEPRESSION: Chronic | ICD-10-CM

## 2017-12-19 PROBLEM — E78.9 BORDERLINE HIGH CHOLESTEROL: Chronic | Status: ACTIVE | Noted: 2017-04-20

## 2017-12-19 PROBLEM — E66.01 OBESITY, MORBID (HCC): Status: ACTIVE | Noted: 2017-12-19

## 2017-12-19 RX ORDER — SERTRALINE HYDROCHLORIDE 100 MG/1
TABLET, FILM COATED ORAL
Qty: 60 TAB | Refills: 5 | Status: SHIPPED | OUTPATIENT
Start: 2017-12-19 | End: 2018-07-23 | Stop reason: SDUPTHER

## 2017-12-19 RX ORDER — ZOLMITRIPTAN 5 MG/1
5 TABLET, ORALLY DISINTEGRATING ORAL AS NEEDED
Qty: 12 TAB | Refills: 5 | Status: SHIPPED | OUTPATIENT
Start: 2017-12-19 | End: 2020-09-01

## 2017-12-19 NOTE — PROGRESS NOTES
Benedict Torres is a 32 y.o. female (: 1990) presenting to address:    Chief Complaint   Patient presents with    Head Pain       Vitals:    17 1300   Weight: 279 lb 9.6 oz (126.8 kg)   Height: 5' 9\" (1.753 m)   PainSc:   0 - No pain   LMP: 2017       Hearing/Vision:   No exam data present    Learning Assessment:     Learning Assessment 2014   PRIMARY LEARNER Patient   HIGHEST LEVEL OF EDUCATION - PRIMARY LEARNER  2 YEARS OF COLLEGE   BARRIERS PRIMARY LEARNER NONE   PRIMARY LANGUAGE ENGLISH   LEARNER PREFERENCE PRIMARY DEMONSTRATION   ANSWERED BY patient   RELATIONSHIP SELF     Depression Screening:     PHQ over the last two weeks 10/29/2014   PHQ Not Done Active Diagnosis of Depression or Bipolar Disorder   Little interest or pleasure in doing things Not at all   Feeling down, depressed or hopeless Several days   Total Score PHQ 2 1     Fall Risk Assessment:     Fall Risk Assessment, last 12 mths 10/29/2014   Able to walk? Yes   Fall in past 12 months? No     Abuse Screening:     Abuse Screening Questionnaire 2017   Do you ever feel afraid of your partner? N   Are you in a relationship with someone who physically or mentally threatens you? N   Is it safe for you to go home? Y     Coordination of Care Questionaire:   1. Have you been to the ER, urgent care clinic since your last visit? Hospitalized since your last visit? NO    2. Have you seen or consulted any other health care providers outside of the [address] [place] [address] since your last visit? Include any pap smears or colon screening. NO    Advanced Directive:   1. Do you have an Advanced Directive? NO    2. Would you like information on Advanced Directives?  NO

## 2017-12-19 NOTE — PATIENT INSTRUCTIONS
No visits with results within 2 Week(s) from this visit. Latest known visit with results is:    Office Visit on 04/20/2017   Component Date Value Ref Range Status    WBC 04/20/2017 7.0  4.0 - 11.0 K/uL Final    RBC 04/20/2017 4.65  3.80 - 5.20 M/uL Final    HGB 04/20/2017 13.6  11.7 - 15.5 g/dL Final    HCT 04/20/2017 42.5  35.1 - 46.5 % Final    MCV 04/20/2017 91  80 - 95 fL Final    MCH 04/20/2017 29  26 - 34 pg Final    MCHC 04/20/2017 32  32 - 36 g/dL Final    RDW 04/20/2017 14.0  10.0 - 16.0 % Final    PLATELET 07/63/3331 376  140 - 440 K/uL Final    MPV 04/20/2017 10.3  6.0 - 10.8 fL Final    NEUTROPHILS 04/20/2017 66  40 - 75 % Final    Lymphocytes 04/20/2017 26* 27 - 45 % Final    MONOCYTES 04/20/2017 4  3 - 9 % Final    EOSINOPHILS 04/20/2017 3  0 - 6 % Final    BASOPHILS 04/20/2017 0  0 - 2 % Final    ABS. NEUTROPHILS 04/20/2017 4.6  1.8 - 7.7 K/uL Final    ABSOLUTE LYMPHOCYTE COUNT 04/20/2017 1.8  1.0 - 4.8 K/uL Final    ABS. MONOCYTES 04/20/2017 0.3  0.1 - 0.9 K/uL Final    ABS. EOSINOPHILS 04/20/2017 0.2  0.0 - 0.5 K/uL Final    ABS. BASOPHILS 04/20/2017 0.0  0.0 - 0.2 K/uL Final    Glucose 04/20/2017 87  65 - 99 mg/dL Final    BUN 04/20/2017 15  6 - 22 mg/dL Final    Creatinine 04/20/2017 0.7  0.5 - 1.2 mg/dL Final    Sodium 04/20/2017 140  133 - 145 mmol/L Final    Potassium 04/20/2017 4.2  3.5 - 5.5 mmol/L Final    Chloride 04/20/2017 98  98 - 110 mmol/L Final    CO2 04/20/2017 23  20 - 32 mmol/L Final    AST (SGOT) 04/20/2017 22  10 - 37 U/L Final    ALT (SGPT) 04/20/2017 19  5 - 40 U/L Final    Alk.  phosphatase 04/20/2017 61  25 - 115 U/L Final    Bilirubin, total 04/20/2017 0.2  0.2 - 1.2 mg/dL Final    Calcium 04/20/2017 9.4  8.4 - 10.5 mg/dL Final    Protein, total 04/20/2017 7.6  6.4 - 8.3 g/dL Final    Albumin 04/20/2017 4.6  3.5 - 5.0 g/dL Final    A-G Ratio 04/20/2017 1.5  1.1 - 2.6 ratio Final    Globulin 04/20/2017 3.0  2.0 - 4.0 g/dL Final    Anion gap 04/20/2017 19.0  mmol/L Final    Comment: Test includes Albumin, Alkaline Phosphatase, ALT, AST, BUN, Calcium, CO2,  Chloride, Creatinine, Glucose, Potassium, Sodium, Total Bilirubin and Total  Protein. Estimated GFR results are reported in mL/min/1.73 sq.m. by the MDRD equation. This eGFR is validated for stable chronic renal failure patients. This   equation  is unreliable in acute illness or patients with normal renal function.  GFRAA 04/20/2017 >60.0  >60.0 Final    GFRNA 04/20/2017 >60.0  >60.0 Final    Triglyceride 04/20/2017 80  40 - 149 mg/dL Final    HDL Cholesterol 04/20/2017 67* 40 - 59 mg/dL Final    Cholesterol, total 04/20/2017 209* 110 - 200 mg/dL Final    LDL, calculated 04/20/2017 126* 50 - 99 mg/dL Final    VLDL, calculated 04/20/2017 16  8 - 30 mg/dL Final    Comment: Test includes cholesterol, HDL cholesterol, triglycerides and LDL.   Cholesterol Recommended NCEP guidelines in mg/dL:  Less than 200      Desirable  200 - 239          Borderline High  Greater than or  = to 240   High      VITAMIN D, 25-HYDROXY 04/20/2017 57.3  32.0 - 100.0 ng/mL Final    T4, Free 04/20/2017 1.0  0.9 - 1.8 ng/dL Final    TSH 04/20/2017 1.01  0.27 - 4.20 mcU/mL Final    Hemoglobin A1c 04/20/2017 5.2  4.8 - 5.9 % Final    AVG GLU 04/20/2017 104  91 - 123 mg/dL Final    Specific Gravity 04/20/2017 1.016  1.005 - 1.03 Final    pH (UA) 04/20/2017 6.0  5.0 - 8.0 pH Final    Protein 04/20/2017 Negative  Negative, mg/dL Final    Glucose 04/20/2017 Negative  Negative mg/dL Final    Ketone 04/20/2017 Negative  Negative mg/dL Final    Bilirubin 04/20/2017 Negative  Negative Final    Blood 04/20/2017 Negative  Negative Final    Nitrites 04/20/2017 Negative  Negative Final    Leukocyte Esterase 04/20/2017 Negative  Negative Final    Urobilinogen 04/20/2017 <2.0  <2.0 mg/dL Final    WBC 04/20/2017 0-2  0 - 2 /hpf Final    RBC 04/20/2017 Negative  Negative, /hpf Final    Epithelial cells 04/20/2017 0-2  0 - 2 /hpf Final       AFTER VISIT INSTRUCTIONS     Change migraine abortive therapy to Zomig disintegrating tablets. Keep appt with Dr. Emely Hill. Follow up with Damien Parkinson MD in 6 months, sooner PRN (30m). Please arrive 15 minutes prior to your scheduled appointment time. Call and request an earlier appointment if you have any new questions or concerns. LAB HOURS AT [facility]     Monday-Friday 7a-3p  Closed on holidays    TESTING RESULTS     You will be contacted if your lab results indicate a change in therapy or further evaluation. Results of tests performed in this office will be viewable through [facility]. If you need assistance with accessing your [employer] account, you can call the [address] [place], [place] [address] at #[phone]. STAY UP TO DATE WITH YOUR PREVENTIVE HEALTH   Please review the following recommendations and if you are not sure that your healthcare is up to date, ask your provider at your next scheduled office visit. -- Yearly preventive visits (sometimes called \"physicals\") are recommended for all adults. Medicare and Medicaid do not pay for physicals. Medicare covers a yearly wellness visit that differs in many ways from a traditional physical, but is an opportunity to make sure you are maximizing the preventive services that will be covered by Medicare. Each insurance carrier will have different regulations about what services may be covered, so be sure to talk with your insurance provider before scheduling a visit. -- Colon cancer screening is recommended for all patients 48 and older with either colonoscopy (interval will vary depending on results) or yearly stool testing.      -- Mammogram is recommended every 2 years for women ages 36 to 76. -- Pap smear is recommended every 3-5 years for women aged 24 to 72, unless your cervix has been surgically removed for benign reasons.       -- Flu shot is recommended every fall for adults of all ages. -- Prevnar 15 is recommended at the age of 72 for all adults. -- Pneumovax is recommended one year after Prevnar 13 for all adults, but earlier if you have a history of chronic health problems (including diabetes and asthma) or smoking. -- Tetanus boosters are recommended every 10 years for adults of all ages. Medicare and Medicaid do not cover tetanus as a preventive booster, but will pay for patients to receive a booster in the event of certain injuries. MEDICATION REFILLS      -- Controlled substance refills must be obtained during a scheduled office visit with the provider. -- All other medication refills are to be requested by calling your pharmacy during regular office hours. Allow at least 2 business days for processing. Refills will not be provided by the after hours answering service/on call provider. HOLIDAY CLOSURES:     The office is closed on six major holidays each year:  New Year's Day, July 4th, Memorial Day, Labor Day, Thanksgiving, and [place] Day. Lab and X-ray services are not available on those days.

## 2017-12-20 PROBLEM — F33.9 RECURRENT DEPRESSION (HCC): Status: ACTIVE | Noted: 2017-12-20

## 2018-01-25 ENCOUNTER — OFFICE VISIT (OUTPATIENT)
Dept: FAMILY MEDICINE CLINIC | Age: 28
End: 2018-01-25

## 2018-01-25 VITALS
HEIGHT: 69 IN | HEART RATE: 85 BPM | RESPIRATION RATE: 20 BRPM | DIASTOLIC BLOOD PRESSURE: 100 MMHG | WEIGHT: 274 LBS | BODY MASS INDEX: 40.58 KG/M2 | SYSTOLIC BLOOD PRESSURE: 150 MMHG | TEMPERATURE: 98.2 F | OXYGEN SATURATION: 98 %

## 2018-01-25 DIAGNOSIS — Z20.2 POSSIBLE EXPOSURE TO STD: Primary | ICD-10-CM

## 2018-01-25 DIAGNOSIS — F51.02 ADJUSTMENT INSOMNIA: ICD-10-CM

## 2018-01-25 PROBLEM — F51.01 PRIMARY INSOMNIA: Status: ACTIVE | Noted: 2018-01-25

## 2018-01-25 RX ORDER — LORAZEPAM 1 MG/1
1 TABLET ORAL
Qty: 30 TAB | Refills: 0 | Status: SHIPPED | OUTPATIENT
Start: 2018-01-25

## 2018-01-25 RX ORDER — SUMATRIPTAN 100 MG/1
100 TABLET, FILM COATED ORAL
COMMUNITY
End: 2018-04-26 | Stop reason: SDUPTHER

## 2018-01-25 RX ORDER — AMITRIPTYLINE HYDROCHLORIDE 10 MG/1
10 TABLET, FILM COATED ORAL
COMMUNITY
End: 2019-10-29 | Stop reason: ALTCHOICE

## 2018-01-25 RX ORDER — ONDANSETRON 4 MG/1
4 TABLET, FILM COATED ORAL
COMMUNITY

## 2018-01-25 NOTE — MR AVS SNAPSHOT
[address] [place] [address]  [address] [address] [facility] [address] 
[phone] Patient: Griselda Broderick MRN: [id number] PQD:6/[birth year] Visit Information Date & Time Provider Department Dept. Phone Encounter #  
 1/25/2018  2:00 PM Ashu Akins, [address] [phone] [id number] Upcoming Health Maintenance Date Due  
 PAP AKA CERVICAL CYTOLOGY 4/16/2018 DTaP/Tdap/Td series (2 - Td) 7/14/2027 Allergies as of 1/25/2018  Review Complete On: 1/25/2018 By: Mustapha Hauser LPN Severity Noted Reaction Type Reactions Gluten High 07/18/2014   Intolerance Other (comments)  
 migraine Corn  03/15/2013    Hives Naproxen  03/15/2013    Hives Tolerates ibuprofen [place]  03/15/2013    Hives Current Immunizations  Reviewed on 4/18/2017 Name Date Influenza Vaccine 9/11/2017, 9/23/2016, 9/23/2016, 11/8/2014 Pneumococcal Polysaccharide (PPSV-23) 10/12/2016 TB Skin Test (PPD) 2/7/2017 TB Skin Test (PPD) Intradermal 1/6/2014 Tdap 7/14/2017 Not reviewed this visit You Were Diagnosed With   
  
 Codes Comments Possible exposure to STD    -  Primary ICD-10-CM: Z20.2 ICD-9-CM: V01.6 Adjustment insomnia     ICD-10-CM: F51.02 
ICD-9-CM: 307.41 Vitals BP Pulse Temp Resp Height(growth percentile) Weight(growth percentile) (!) 150/100 85 98.2 °F (36.8 °C) (Oral) 20 5' 9\" (1.753 m) 274 lb (124.3 kg) LMP SpO2 BMI OB Status Smoking Status 01/11/2018 (Exact Date) 98% 40.46 kg/m2 Having regular periods Former Smoker Vitals History BMI and BSA Data Body Mass Index Body Surface Area 40.46 kg/m 2 2.46 m 2 Preferred Pharmacy Pharmacy Name Phone [facility] [address] [phone] Your Updated Medication List  
  
   
This list is accurate as of: 1/25/18  2:18 PM.  Always use your most recent med list.  
  
  
  
  
 amitriptyline 10 mg tablet Commonly known as:  ELAVIL Take 10 mg by mouth nightly. Biotin 2,500 mcg Cap Take  by mouth. cholecalciferol (VITAMIN D3) 5,000 unit Tab tablet Commonly known as:  VITAMIN D3 Take  by mouth daily. gabapentin 300 mg capsule Commonly known as:  NEURONTIN Take 300 mg by mouth three (3) times daily. LORazepam 1 mg tablet Commonly known as:  ATIVAN Take 1 Tab by mouth nightly as needed for Anxiety. Max Daily Amount: 1 mg. Nebulizer & Compressor machine Wuse nebulizer with albuterol solution up to every 4 hours if needed for cough, wheezing PNV#16-Iron Fum & PS-FA-OM-3 35-1-200 mg Cap Take  by mouth. * PROAIR HFA 90 mcg/actuation inhaler Generic drug:  albuterol Take  by inhalation. * albuterol 2.5 mg /3 mL (0.083 %) nebulizer solution Commonly known as:  PROVENTIL VENTOLIN Use with nebulizer up to every 4 hours as needed  
  
 promethazine 25 mg tablet Commonly known as:  PHENERGAN Take 1 Tab by mouth every eight (8) hours as needed for Nausea. riboflavin (vitamin B2) 100 mg tablet Take 400 mg by mouth daily. sertraline 100 mg tablet Commonly known as:  ZOLOFT  
TAKE 2 TABLETS BY MOUTH DAILY. SUMAtriptan 100 mg tablet Commonly known as:  IMITREX Take 100 mg by mouth once as needed for Migraine. TYLENOL 325 mg tablet Generic drug:  acetaminophen Take  by mouth every four (4) hours as needed for Pain.  
  
 verapamil  mg CR tablet Commonly known as:  CALAN-SR Take  by mouth nightly. ZOFRAN (AS HYDROCHLORIDE) 4 mg tablet Generic drug:  ondansetron hcl Take 4 mg by mouth every eight (8) hours as needed for Nausea. ZOLMitriptan 5 mg disintegrating tablet Commonly known as:  ZOMIG-ZMT Take 1 Tab by mouth as needed for Migraine. * Notice:   This list has 2 medication(s) that are the same as other medications prescribed for you. Read the directions carefully, and ask your doctor or other care provider to review them with you. Prescriptions Printed Refills LORazepam (ATIVAN) 1 mg tablet 0 Sig: Take 1 Tab by mouth nightly as needed for Anxiety. Max Daily Amount: 1 mg. Class: Print Route: Oral  
  
We Performed the Following HIV 1/2 AG/AB, 4TH GENERATION,W RFLX CONFIRM [id number] CPT(R)] To-Do List   
 01/25/2018 Lab:  CHLAMYDIA/GC PCR   
  
 01/25/2018 Lab:  HEP B SURFACE AG   
  
 01/25/2018 Lab:  HEPATITIS C AB   
  
 01/25/2018 Lab:  HSV 1/2 AB, IGM   
  
 01/25/2018 Lab:  T PALLIDUM AB Patient Instructions No visits with results within 2 Week(s) from this visit. Latest known visit with results is: 
 
Office Visit on 04/20/2017 Component Date Value Ref Range Status  WBC 04/20/2017 7.0  4.0 - 11.0 K/uL Final  
 RBC 04/20/2017 4.65  3.80 - 5.20 M/uL Final  
 HGB 04/20/2017 13.6  11.7 - 15.5 g/dL Final  
 HCT 04/20/2017 42.5  35.1 - 46.5 % Final  
 MCV 04/20/2017 91  80 - 95 fL Final  
 MCH 04/20/2017 29  26 - 34 pg Final  
 MCHC 04/20/2017 32  32 - 36 g/dL Final  
 RDW 04/20/2017 14.0  10.0 - 16.0 % Final  
 PLATELET 26/21/3884 723  140 - 440 K/uL Final  
 MPV 04/20/2017 10.3  6.0 - 10.8 fL Final  
 NEUTROPHILS 04/20/2017 66  40 - 75 % Final  
 Lymphocytes 04/20/2017 26* 27 - 45 % Final  
 MONOCYTES 04/20/2017 4  3 - 9 % Final  
 EOSINOPHILS 04/20/2017 3  0 - 6 % Final  
 BASOPHILS 04/20/2017 0  0 - 2 % Final  
 ABS. NEUTROPHILS 04/20/2017 4.6  1.8 - 7.7 K/uL Final  
 ABSOLUTE LYMPHOCYTE COUNT 04/20/2017 1.8  1.0 - 4.8 K/uL Final  
 ABS. MONOCYTES 04/20/2017 0.3  0.1 - 0.9 K/uL Final  
 ABS. EOSINOPHILS 04/20/2017 0.2  0.0 - 0.5 K/uL Final  
 ABS.  BASOPHILS 04/20/2017 0.0  0.0 - 0.2 K/uL Final  
 Glucose 04/20/2017 87  65 - 99 mg/dL Final  
 BUN 04/20/2017 15  6 - 22 mg/dL Final  
  Creatinine 04/20/2017 0.7  0.5 - 1.2 mg/dL Final  
 Sodium 04/20/2017 140  133 - 145 mmol/L Final  
 Potassium 04/20/2017 4.2  3.5 - 5.5 mmol/L Final  
 Chloride 04/20/2017 98  98 - 110 mmol/L Final  
 CO2 04/20/2017 23  20 - 32 mmol/L Final  
 AST (SGOT) 04/20/2017 22  10 - 37 U/L Final  
 ALT (SGPT) 04/20/2017 19  5 - 40 U/L Final  
 Alk. phosphatase 04/20/2017 61  25 - 115 U/L Final  
 Bilirubin, total 04/20/2017 0.2  0.2 - 1.2 mg/dL Final  
 Calcium 04/20/2017 9.4  8.4 - 10.5 mg/dL Final  
 Protein, total 04/20/2017 7.6  6.4 - 8.3 g/dL Final  
 Albumin 04/20/2017 4.6  3.5 - 5.0 g/dL Final  
 A-G Ratio 04/20/2017 1.5  1.1 - 2.6 ratio Final  
 Globulin 04/20/2017 3.0  2.0 - 4.0 g/dL Final  
 Anion gap 04/20/2017 19.0  mmol/L Final  
 Comment: Test includes Albumin, Alkaline Phosphatase, ALT, AST, BUN, Calcium, CO2, Chloride, Creatinine, Glucose, Potassium, Sodium, Total Bilirubin and Total 
Protein. Estimated GFR results are reported in mL/min/1.73 sq.m. by the MDRD equation. This eGFR is validated for stable chronic renal failure patients. This  
equation 
is unreliable in acute illness or patients with normal renal function.  GFRAA 04/20/2017 >60.0  >60.0 Final  
 GFRNA 04/20/2017 >60.0  >60.0 Final  
 Triglyceride 04/20/2017 80  40 - 149 mg/dL Final  
 HDL Cholesterol 04/20/2017 67* 40 - 59 mg/dL Final  
 Cholesterol, total 04/20/2017 209* 110 - 200 mg/dL Final  
 LDL, calculated 04/20/2017 126* 50 - 99 mg/dL Final  
 VLDL, calculated 04/20/2017 16  8 - 30 mg/dL Final  
 Comment: Test includes cholesterol, HDL cholesterol, triglycerides and LDL. Cholesterol Recommended NCEP guidelines in mg/dL: 
Less than 200      Desirable 200 - 239          Borderline High Greater than or  = to 240   High  VITAMIN D, 25-HYDROXY 04/20/2017 57.3  32.0 - 100.0 ng/mL Final  
 T4, Free 04/20/2017 1.0  0.9 - 1.8 ng/dL Final  
 TSH 04/20/2017 1.01  0.27 - 4.20 mcU/mL Final  
  Hemoglobin A1c 04/20/2017 5.2  4.8 - 5.9 % Final  
 AVG GLU 04/20/2017 104  91 - 123 mg/dL Final  
 Specific Gravity 04/20/2017 1.016  1.005 - 1.03 Final  
 pH (UA) 04/20/2017 6.0  5.0 - 8.0 pH Final  
 Protein 04/20/2017 Negative  Negative, mg/dL Final  
 Glucose 04/20/2017 Negative  Negative mg/dL Final  
 Ketone 04/20/2017 Negative  Negative mg/dL Final  
 Bilirubin 04/20/2017 Negative  Negative Final  
 Blood 04/20/2017 Negative  Negative Final  
 Nitrites 04/20/2017 Negative  Negative Final  
 Leukocyte Esterase 04/20/2017 Negative  Negative Final  
 Urobilinogen 04/20/2017 <2.0  <2.0 mg/dL Final  
 WBC 04/20/2017 0-2  0 - 2 /hpf Final  
 RBC 04/20/2017 Negative  Negative, /hpf Final  
 Epithelial cells 04/20/2017 0-2  0 - 2 /hpf Final  
 
 
AFTER VISIT INSTRUCTIONS  
 
-- Labs today - results should be back in 2-3 days -- As needed lorazepam at bedtime, call if any issues, don't combine with alcohol  
 
-- Keep your scheduled counseling appointment on Feb 12th 
 
 
 
LAB HOURS AT [facility]. Monday-Friday 7a-3p Closed on holidays TESTING RESULTS If your testing results require a change in therapy or additional evaluation attempts will be made to contact you through [employer] if you are active or by phone. Normal results will be released to [employer] or sent by [address] mail. Results of tests performed at a non[facility] facility will not be available for review in [address] If you need assistance with accessing your [employer] account, you can call the [address] [place] [address] at #[phone]. STAY UP  [address] Please review the following recommendations and if you are not sure that your healthcare is up to date, ask your provider at your next scheduled office visit. -- Yearly preventive visits (sometimes called \"physicals\") are recommended for all adults. Medicare and Medicaid do not pay for physicals.   Medicare covers a yearly wellness visit that differs in many ways from a traditional physical, but is an opportunity to make sure you are maximizing the preventive services that will be covered by Medicare. Each insurance carrier will have different regulations about what services may be covered, so be sure to talk with your insurance provider before scheduling a visit. -- Colon cancer screening is recommended for all patients 48 and older with either colonoscopy (interval will vary depending on results) or yearly stool testing.   
 
-- Mammogram is recommended every 2 years for women ages 36 to 76. -- Pap smear is recommended every 3-5 years for women aged 24 to 72, unless your cervix has been surgically removed for benign reasons. -- Flu shot is recommended every fall for adults of all ages. -- Prevnar 15 is recommended at the age of 72 for all adults. -- Pneumovax is recommended one year after Prevnar 13 for all adults, but earlier if you have a history of chronic health problems (including diabetes and asthma) or smoking. -- Tetanus boosters are recommended every 10 years for adults of all ages. Medicare and Medicaid do not cover tetanus as a preventive booster, but will pay for patients to receive a booster in the event of certain injuries. MEDICATION REFILLS   
 
-- Medication refills should be requested by calling your pharmacy during regular office hours. Allow at least 2 business days for processing. Refills will not be provided by the after hours answering service/on call provider. HOLIDAY CLOSURES:  
 
The office is closed on six major holidays each year:  New Year's Day, July 4th, Memorial Day, Labor Day, Thanksgiving, and [place] Day. Lab and X-ray services are not available on those days. Introducing [facility]! Dear Servando Whitaker: Thank you for requesting a [employer] account.   Our records indicate that you already have an active [email or web address] account. You can access your account anytime at [email or web address] Did you know that you can access your hospital and ER discharge instructions at any time in [email or web address]? You can also review all of your test results from your hospital stay or ER visit. Additional Information If you have questions, please visit the Frequently Asked Questions section of the [email or web address] website at [email or web address] Remember, [email or web address] is NOT to be used for urgent needs. For medical emergencies, dial 911. Now available from your iPhone and Android! Please provide this summary of care documentation to your next provider. Your primary care clinician is listed as [facility]. If you have any questions after today's visit, please call [phone].

## 2018-01-25 NOTE — PROGRESS NOTES
Balyee Kulkarni is a 32 y.o. female (: 1990) presenting to address:    Chief Complaint   Patient presents with    Depression       Vitals:    18 1353   BP: (!) 150/100   Pulse: 85   Resp: 20   Temp: 98.2 °F (36.8 °C)   TempSrc: Oral   SpO2: 98%   Weight: 274 lb (124.3 kg)   Height: 5' 9\" (1.753 m)   PainSc:   0 - No pain   LMP: 2018       Hearing/Vision:   No exam data present    Learning Assessment:     Learning Assessment 2014   PRIMARY LEARNER Patient   HIGHEST LEVEL OF EDUCATION - PRIMARY LEARNER  2 YEARS OF COLLEGE   BARRIERS PRIMARY LEARNER NONE   PRIMARY LANGUAGE ENGLISH   LEARNER PREFERENCE PRIMARY DEMONSTRATION   ANSWERED BY patient   RELATIONSHIP SELF     Depression Screening:     PHQ over the last two weeks 10/29/2014   PHQ Not Done Active Diagnosis of Depression or Bipolar Disorder   Little interest or pleasure in doing things Not at all   Feeling down, depressed or hopeless Several days   Total Score PHQ 2 1     Fall Risk Assessment:     Fall Risk Assessment, last 12 mths 10/29/2014   Able to walk? Yes   Fall in past 12 months? No     Abuse Screening:     Abuse Screening Questionnaire 2017   Do you ever feel afraid of your partner? N   Are you in a relationship with someone who physically or mentally threatens you? N   Is it safe for you to go home? Y     Coordination of Care Questionaire:   1. Have you been to the ER, urgent care clinic since your last visit? Hospitalized since your last visit? NO    2. Have you seen or consulted any other health care providers outside of the [address] [place] [address] since your last visit? Include any pap smears or colon screening. YES neurology    Advanced Directive:   1. Do you have an Advanced Directive? NO    2. Would you like information on Advanced Directives?  NO

## 2018-01-25 NOTE — PATIENT INSTRUCTIONS
No visits with results within 2 Week(s) from this visit. Latest known visit with results is:    Office Visit on 04/20/2017   Component Date Value Ref Range Status    WBC 04/20/2017 7.0  4.0 - 11.0 K/uL Final    RBC 04/20/2017 4.65  3.80 - 5.20 M/uL Final    HGB 04/20/2017 13.6  11.7 - 15.5 g/dL Final    HCT 04/20/2017 42.5  35.1 - 46.5 % Final    MCV 04/20/2017 91  80 - 95 fL Final    MCH 04/20/2017 29  26 - 34 pg Final    MCHC 04/20/2017 32  32 - 36 g/dL Final    RDW 04/20/2017 14.0  10.0 - 16.0 % Final    PLATELET 98/28/2317 594  140 - 440 K/uL Final    MPV 04/20/2017 10.3  6.0 - 10.8 fL Final    NEUTROPHILS 04/20/2017 66  40 - 75 % Final    Lymphocytes 04/20/2017 26* 27 - 45 % Final    MONOCYTES 04/20/2017 4  3 - 9 % Final    EOSINOPHILS 04/20/2017 3  0 - 6 % Final    BASOPHILS 04/20/2017 0  0 - 2 % Final    ABS. NEUTROPHILS 04/20/2017 4.6  1.8 - 7.7 K/uL Final    ABSOLUTE LYMPHOCYTE COUNT 04/20/2017 1.8  1.0 - 4.8 K/uL Final    ABS. MONOCYTES 04/20/2017 0.3  0.1 - 0.9 K/uL Final    ABS. EOSINOPHILS 04/20/2017 0.2  0.0 - 0.5 K/uL Final    ABS. BASOPHILS 04/20/2017 0.0  0.0 - 0.2 K/uL Final    Glucose 04/20/2017 87  65 - 99 mg/dL Final    BUN 04/20/2017 15  6 - 22 mg/dL Final    Creatinine 04/20/2017 0.7  0.5 - 1.2 mg/dL Final    Sodium 04/20/2017 140  133 - 145 mmol/L Final    Potassium 04/20/2017 4.2  3.5 - 5.5 mmol/L Final    Chloride 04/20/2017 98  98 - 110 mmol/L Final    CO2 04/20/2017 23  20 - 32 mmol/L Final    AST (SGOT) 04/20/2017 22  10 - 37 U/L Final    ALT (SGPT) 04/20/2017 19  5 - 40 U/L Final    Alk.  phosphatase 04/20/2017 61  25 - 115 U/L Final    Bilirubin, total 04/20/2017 0.2  0.2 - 1.2 mg/dL Final    Calcium 04/20/2017 9.4  8.4 - 10.5 mg/dL Final    Protein, total 04/20/2017 7.6  6.4 - 8.3 g/dL Final    Albumin 04/20/2017 4.6  3.5 - 5.0 g/dL Final    A-G Ratio 04/20/2017 1.5  1.1 - 2.6 ratio Final    Globulin 04/20/2017 3.0  2.0 - 4.0 g/dL Final    Anion gap 04/20/2017 19.0  mmol/L Final    Comment: Test includes Albumin, Alkaline Phosphatase, ALT, AST, BUN, Calcium, CO2,  Chloride, Creatinine, Glucose, Potassium, Sodium, Total Bilirubin and Total  Protein. Estimated GFR results are reported in mL/min/1.73 sq.m. by the MDRD equation. This eGFR is validated for stable chronic renal failure patients. This   equation  is unreliable in acute illness or patients with normal renal function.  GFRAA 04/20/2017 >60.0  >60.0 Final    GFRNA 04/20/2017 >60.0  >60.0 Final    Triglyceride 04/20/2017 80  40 - 149 mg/dL Final    HDL Cholesterol 04/20/2017 67* 40 - 59 mg/dL Final    Cholesterol, total 04/20/2017 209* 110 - 200 mg/dL Final    LDL, calculated 04/20/2017 126* 50 - 99 mg/dL Final    VLDL, calculated 04/20/2017 16  8 - 30 mg/dL Final    Comment: Test includes cholesterol, HDL cholesterol, triglycerides and LDL.   Cholesterol Recommended NCEP guidelines in mg/dL:  Less than 200      Desirable  200 - 239          Borderline High  Greater than or  = to 240   High      VITAMIN D, 25-HYDROXY 04/20/2017 57.3  32.0 - 100.0 ng/mL Final    T4, Free 04/20/2017 1.0  0.9 - 1.8 ng/dL Final    TSH 04/20/2017 1.01  0.27 - 4.20 mcU/mL Final    Hemoglobin A1c 04/20/2017 5.2  4.8 - 5.9 % Final    AVG GLU 04/20/2017 104  91 - 123 mg/dL Final    Specific Gravity 04/20/2017 1.016  1.005 - 1.03 Final    pH (UA) 04/20/2017 6.0  5.0 - 8.0 pH Final    Protein 04/20/2017 Negative  Negative, mg/dL Final    Glucose 04/20/2017 Negative  Negative mg/dL Final    Ketone 04/20/2017 Negative  Negative mg/dL Final    Bilirubin 04/20/2017 Negative  Negative Final    Blood 04/20/2017 Negative  Negative Final    Nitrites 04/20/2017 Negative  Negative Final    Leukocyte Esterase 04/20/2017 Negative  Negative Final    Urobilinogen 04/20/2017 <2.0  <2.0 mg/dL Final    WBC 04/20/2017 0-2  0 - 2 /hpf Final    RBC 04/20/2017 Negative  Negative, /hpf Final    Epithelial cells 04/20/2017 0-2  0 - 2 /hpf Final       AFTER VISIT INSTRUCTIONS     -- Labs today - results should be back in 2-3 days    -- As needed lorazepam at bedtime, call if any issues, don't combine with alcohol     -- Keep your scheduled counseling appointment on Feb 12th        LAB HOURS AT [facility]     Monday-Friday 7a-3p  Closed on holidays    TESTING RESULTS     If your testing results require a change in therapy or additional evaluation attempts will be made to contact you through [employer] if you are active or by phone. Normal results will be released to [employer] or sent by [address] mail. Results of tests performed at a non[facility] facility will not be available for review in [address] If you need assistance with accessing your [employer] account, you can call the [address] [place] [address] [facility] at #[phone]. STAY UP TO DATE WITH YOUR PREVENTIVE HEALTH   Please review the following recommendations and if you are not sure that your healthcare is up to date, ask your provider at your next scheduled office visit. -- Yearly preventive visits (sometimes called \"physicals\") are recommended for all adults. Medicare and Medicaid do not pay for physicals. Medicare covers a yearly wellness visit that differs in many ways from a traditional physical, but is an opportunity to make sure you are maximizing the preventive services that will be covered by Medicare. Each insurance carrier will have different regulations about what services may be covered, so be sure to talk with your insurance provider before scheduling a visit. -- Colon cancer screening is recommended for all patients 48 and older with either colonoscopy (interval will vary depending on results) or yearly stool testing.      -- Mammogram is recommended every 2 years for women ages 36 to 76. -- Pap smear is recommended every 3-5 years for women aged 24 to 72, unless your cervix has been surgically removed for benign reasons.       -- Flu shot is recommended every fall for adults of all ages. -- Prevnar 15 is recommended at the age of 72 for all adults. -- Pneumovax is recommended one year after Prevnar 13 for all adults, but earlier if you have a history of chronic health problems (including diabetes and asthma) or smoking. -- Tetanus boosters are recommended every 10 years for adults of all ages. Medicare and Medicaid do not cover tetanus as a preventive booster, but will pay for patients to receive a booster in the event of certain injuries. MEDICATION REFILLS      -- Medication refills should be requested by calling your pharmacy during regular office hours. Allow at least 2 business days for processing. Refills will not be provided by the after hours answering service/on call provider. HOLIDAY CLOSURES:     The office is closed on six major holidays each year:  New Year's Day, July 4th, Memorial Day, Labor Day, Thanksgiving, and [place] Day. Lab and X-ray services are not available on those days.

## 2018-01-25 NOTE — PROGRESS NOTES
PRE-VISIT PLANNING:     Future Appointments  Date Time Provider Barb Harris   2018 2:00 PM Ana Martinez  [employer] (PCP is Ana Martinez MD) is scheduled for an office visit with Ana Martinez MD on 2018 for acute care visit, patient requested lab/medication follow up. Encounter opened prior to visit to complete pre-visit planning, update and review pertinent sections in the chart. Last office visit with PCP was 2017.  6 month follow up advised. Rooming Nurse Items:  -- Reminder Pap is due in April     Pending items for provider to review with patient:    Health Maintenance Due   Topic Date Due    PAP AKA CERVICAL CYTOLOGY  2018          Internal Medicine  Follow Up Note  [address] at [place] [address] [address], [facility], [address] [facility]  Phone [phone]; Fax [phone]    Date of Service:  2018  Patient's Name & : Galina Zaman - 1990     Assessment/Plan/Orders:       Galina Zaman is a 32 y.o. female who was seen today for new concerns. The primary encounter diagnosis was Possible exposure to STD. A diagnosis of Adjustment insomnia was also pertinent to this visit. BP elevated due to emotional distress  PRN lorazepam for short term use (zolpidem ineffective in the past), discussed with patient med is only for short term, not appropriate for longterm treatment of insomnia. HM recommendations reviewed with patient. Body mass index is 40.46 kg/(m^2). Discussed achieving/maintaining healthy weight and BMI. Follow up BMI/weight at next visit. The patient states understanding of recommended treatment plan.       Patient instructions:  (see AVS)  -- Labs today - results should be back in 2-3 days    -- As needed lorazepam at bedtime, call if any issues, don't combine with alcohol     -- Keep your scheduled counseling appointment on     Orders Placed This Encounter    CHLAMYDIA/GC AMPLIFIED (Specify Source)     Standing Status:   Future     Standing Expiration Date:   1/26/2019     Order Specific Question:   Sample source     Answer:   Urine [258]     Order Specific Question:   Specimen source     Answer:   Urine [258]    T PALLIDUM AB     Standing Status:   Future     Standing Expiration Date:   1/26/2019    HEP B SURFACE AG     Standing Status:   Future     Standing Expiration Date:   1/26/2019    HEPATITIS C AB     Standing Status:   Future     Standing Expiration Date:   1/26/2019    HIV 1/2 AG/AB, 4TH GENERATION,W RFLX CONFIRM    HSV 1/2 AB, IGM     Standing Status:   Future     Standing Expiration Date:   1/26/2019    amitriptyline (ELAVIL) 10 mg tablet     Sig: Take 10 mg by mouth nightly.  ondansetron hcl (ZOFRAN, AS HYDROCHLORIDE,) 4 mg tablet     Sig: Take 4 mg by mouth every eight (8) hours as needed for Nausea.  SUMAtriptan (IMITREX) 100 mg tablet     Sig: Take 100 mg by mouth once as needed for Migraine.  LORazepam (ATIVAN) 1 mg tablet     Sig: Take 1 Tab by mouth nightly as needed for Anxiety. Max Daily Amount: 1 mg. Dispense:  30 Tab     Refill:  0       Venessa Tolbert MD - Internal Medicine  1/25/2018, 2:19 PM    HPI & ROS:     Chief Complaint   Patient presents with    Other    Insomnia         Baylee Kulkarni presents for follow up. She  has a past medical history of BMI 33.0-33.9,adult (1/2/14); BMI 39.0-39.9,adult (1/2/2014); BMI 40.0-44.9, adult ([email or web address] (12/19/2017); Borderline high LDL cholesterol of 126 (04/20/2017); Depression; H/O echocardiogram (12/5/14); Headache; Headache(784.0); History of pneumonia (11/25/2016); seizure disorder (18 month old); Left facial numbness (11/8/2014); Obstructive sleep apnea (adult) (pediatric); and Posttraumatic stress disorder. Found out recently about spousal infidelity. Requesting STD screening. Denies any domestic abuse, feels safe at home.   Has counseling appointment for first available 2/12/18. Difficulty sleeping (both falling asleep and staying asleep) since finding out, would like short term sleep aid. Tried zolpidem before, didn't work well, doesn't know what dose was given. Patient Active Problem List    Diagnosis    BMI 40.0-44.9, adult (HCC)    Borderline high LDL cholesterol of 126     10 year CVD risk score 0.3% as of 4/20/17      Chronic depression     Sertraline (Zoloft) 200 mg daily      Migraine without aura or status migrainosus     Referred to neurology in 2014 after tx failures on Maxalt and Relpax and propranolol 60 mg daily; Patient now on verapamil, alternating Imitrex and Maxalt PRN; also PRN phenergan for nausea       Review of Systems (positives in bold)   General ROS:  chills, fatigue, fever, hot flashes, malaise, night sweats, sleep disturbance, weight gain, weight loss  Genito-Urinary ROS: dysuria, urinary frequency, urinary urgency, urinary incontinence, hematuria, malodorous urine, flank pain, genital discharge, genital ulcers,  pelvic pain  Psychological ROS: anxiety, behavioral disorder, concentration difficulties, decreased libido, depression, disorientation, hallucinations, irritability, mood swings, obsessive thoughts, sleep disturbances, suicidal ideation, homicidal ideation      Objective:       VS:    Visit Vitals    BP (!) 150/100    Pulse 85    Temp 98.2 °F (36.8 °C) (Oral)    Resp 20    Ht 5' 9\" (1.753 m)    Wt 274 lb (124.3 kg)    LMP 01/11/2018 (Exact Date)    SpO2 98%    BMI 40.46 kg/m2       General:   Age appropriate female seated in exam room chair,        Well-nourished, well-groomed, conversant, alert, in no acute distress. Psych:  Affect upset, tearful, facies and mood are congruent,     behavior and conversation are appropriate, thought process linear and logical   Neuro:   Alert, conversant, following commands, no focal deficits.      Ambulating with narrow based, stable gait without use of assistive device  Skin:    Warm, dry, normal pallor, no rashes noted. Additional History     Past Medical History:   Diagnosis Date    BMI 33.0-33.9,adult 1/2/14    BMI 39.0-39.9,adult 1/2/2014 4/22/17 - BMI 37.66    BMI 40.0-44.9, adult ([facility] [address] 12/19/2017    Borderline high LDL cholesterol of 126 04/20/2017    10 year CVD risk score 0.3% as of 4/20/17    Depression     H/O echocardiogram 12/5/14    Normal ([facility]), negative bubble study    Headache     Headache(784.0)     Zolmitriptan ineffective, Sumatriptan partially effective    History of pneumonia 11/25/2016    Hx of seizure disorder 18 month old    Seizures stopped after bilateral ureteral transplants    Left facial numbness 11/8/2014    Obstructive sleep apnea (adult) (pediatric)     Mild, AHI 8    Posttraumatic stress disorder      Past Surgical History:   Procedure Laterality Date    HX HEENT  08/2017    tonsilectomy    HX OTHER SURGICAL Bilateral 1992    Bilateral ureteral transplants done at < 2 years     Social History   Substance Use Topics    Smoking status: Former Smoker     Types: Cigarettes    Smokeless tobacco: Never Used    Alcohol use Yes      Comment: on occasion     Current Outpatient Prescriptions   Medication Sig    amitriptyline (ELAVIL) 10 mg tablet Take 10 mg by mouth nightly.  ondansetron hcl (ZOFRAN, AS HYDROCHLORIDE,) 4 mg tablet Take 4 mg by mouth every eight (8) hours as needed for Nausea.  SUMAtriptan (IMITREX) 100 mg tablet Take 100 mg by mouth once as needed for Migraine.  LORazepam (ATIVAN) 1 mg tablet Take 1 Tab by mouth nightly as needed for Anxiety. Max Daily Amount: 1 mg.  sertraline (ZOLOFT) 100 mg tablet TAKE 2 TABLETS BY MOUTH DAILY.  gabapentin (NEURONTIN) 300 mg capsule Take 300 mg by mouth three (3) times daily.     Nebulizer & Compressor machine Wuse nebulizer with albuterol solution up to every 4 hours if needed for cough, wheezing    albuterol (PROVENTIL VENTOLIN) 2.5 mg /3 mL (0.083 %) nebulizer solution Use with nebulizer up to every 4 hours as needed    albuterol (PROAIR HFA) 90 mcg/actuation inhaler Take  by inhalation.  acetaminophen (TYLENOL) 325 mg tablet Take  by mouth every four (4) hours as needed for Pain.  Biotin 2,500 mcg cap Take  by mouth.  cholecalciferol, VITAMIN D3, (VITAMIN D3) 5,000 unit tab tablet Take  by mouth daily.  PNV#16-Iron Fum & PS-FA-OM-3 35-1-200 mg cap Take  by mouth.  riboflavin, vitamin B2, 100 mg tablet Take 400 mg by mouth daily.  verapamil SR (CALAN-SR) 240 mg CR tablet Take  by mouth nightly.  ZOLMitriptan (ZOMIG-ZMT) 5 mg disintegrating tablet Take 1 Tab by mouth as needed for Migraine.  promethazine (PHENERGAN) 25 mg tablet Take 1 Tab by mouth every eight (8) hours as needed for Nausea. No current facility-administered medications for this visit. Allergies   Allergen Reactions    Gluten Other (comments)     migraine    Corn Hives    Naproxen Hives     Tolerates ibuprofen    Strawberry Hives       Encounter Diagnoses:     Encounter Diagnoses     ICD-10-CM ICD-9-CM   1. Possible exposure to STD Z20.2 V01.6   2. Adjustment insomnia F51.02 307.41            This document may have been created with the aid of dictation software.   Text may contain errors, particularly phonetic errors

## 2018-01-26 LAB
CHLAMYDIA AMPLIFIED URINE: NEGATIVE
GC AMPLIFIED URINE,919: NEGATIVE

## 2018-02-01 LAB
HCV AB SER IA-ACNC: NORMAL
HEP B SURFACE AG SCRN, 006510: NORMAL
HIV -1/0/2 AG/AB WITH REFLEX, 13463: NON REACTIVE
HIV 1 & 2 AB SER-IMP: NORMAL
HSV IGM I/II COMBINATION AB: 1.84 RATIO
TREPONEMA PALLIDUM AB: NON REACTIVE

## 2018-02-02 NOTE — PROGRESS NOTES
Attempted to contact patient to discuss results, voicemail left stating another attempt to contact her by phone will be made later today.

## 2018-02-02 NOTE — PROGRESS NOTES
Discussed results with patient and recommendation for repeat HIV testing 6 weeks after possible exposure.

## 2018-04-10 DIAGNOSIS — G43.009 MIGRAINE WITHOUT AURA AND WITHOUT STATUS MIGRAINOSUS, NOT INTRACTABLE: Primary | Chronic | ICD-10-CM

## 2018-04-10 RX ORDER — VERAPAMIL HYDROCHLORIDE 240 MG/1
240 TABLET, FILM COATED, EXTENDED RELEASE ORAL
Qty: 30 TAB | Refills: 0 | Status: SHIPPED | OUTPATIENT
Start: 2018-04-10 | End: 2020-09-01

## 2018-04-10 NOTE — TELEPHONE ENCOUNTER
30 day refill of Verapamil sent to pharmacy until pt establishes with Dr. Angel Garza per [facility] msg request from 4/9/18

## 2018-04-26 DIAGNOSIS — G43.809 OTHER MIGRAINE WITHOUT STATUS MIGRAINOSUS, NOT INTRACTABLE: Chronic | ICD-10-CM

## 2018-04-26 RX ORDER — SUMATRIPTAN 100 MG/1
TABLET, FILM COATED ORAL
Qty: 12 TAB | Refills: 5 | Status: SHIPPED | OUTPATIENT
Start: 2018-04-26 | End: 2019-08-15 | Stop reason: ALTCHOICE

## 2018-05-08 DIAGNOSIS — G43.009 MIGRAINE WITHOUT AURA AND WITHOUT STATUS MIGRAINOSUS, NOT INTRACTABLE: Chronic | ICD-10-CM

## 2018-05-15 RX ORDER — VERAPAMIL HYDROCHLORIDE 240 MG/1
TABLET, FILM COATED, EXTENDED RELEASE ORAL
Qty: 30 TAB | Refills: 0 | OUTPATIENT
Start: 2018-05-15

## 2018-05-15 NOTE — TELEPHONE ENCOUNTER
Called pt and unable to leave message. The call was to inquiry if seen by Neurology. Reveiwed EPIC and confirmed pt is being followed by [facility]. Pt was prescribed Calan SR 30 tablets with 5 refills by GIANNI Cummings. Called and spoke with Deepthi Hobbs, Pharmacist at [address] [place] [address]. And advised of this matter. Staff acknowledges understanding and states will update the pt profile. Care Team updated. Encounter closed.

## 2018-07-23 RX ORDER — SERTRALINE HYDROCHLORIDE 100 MG/1
TABLET, FILM COATED ORAL
Qty: 60 TAB | Refills: 5 | Status: SHIPPED | OUTPATIENT
Start: 2018-07-23 | End: 2019-01-13 | Stop reason: SDUPTHER

## 2018-08-13 ENCOUNTER — OFFICE VISIT (OUTPATIENT)
Dept: FAMILY MEDICINE CLINIC | Age: 28
End: 2018-08-13

## 2018-08-13 VITALS
SYSTOLIC BLOOD PRESSURE: 129 MMHG | RESPIRATION RATE: 20 BRPM | OXYGEN SATURATION: 97 % | HEIGHT: 69 IN | BODY MASS INDEX: 38.51 KG/M2 | WEIGHT: 260 LBS | TEMPERATURE: 97.7 F | DIASTOLIC BLOOD PRESSURE: 77 MMHG | HEART RATE: 91 BPM

## 2018-08-13 DIAGNOSIS — Z13.29 SCREENING FOR THYROID DISORDER: ICD-10-CM

## 2018-08-13 DIAGNOSIS — Z13.29 SCREENING FOR ENDOCRINE, NUTRITIONAL, METABOLIC AND IMMUNITY DISORDER: ICD-10-CM

## 2018-08-13 DIAGNOSIS — Z13.0 SCREENING FOR ENDOCRINE, NUTRITIONAL, METABOLIC AND IMMUNITY DISORDER: ICD-10-CM

## 2018-08-13 DIAGNOSIS — Z13.21 SCREENING FOR ENDOCRINE, NUTRITIONAL, METABOLIC AND IMMUNITY DISORDER: ICD-10-CM

## 2018-08-13 DIAGNOSIS — Z11.59 NEED FOR HEPATITIS B SCREENING TEST: ICD-10-CM

## 2018-08-13 DIAGNOSIS — Z00.00 ROUTINE ADULT HEALTH MAINTENANCE: Primary | ICD-10-CM

## 2018-08-13 DIAGNOSIS — K62.5 BRBPR (BRIGHT RED BLOOD PER RECTUM): ICD-10-CM

## 2018-08-13 DIAGNOSIS — Z11.1 TUBERCULOSIS SCREENING: ICD-10-CM

## 2018-08-13 DIAGNOSIS — Z13.220 SCREENING, LIPID: ICD-10-CM

## 2018-08-13 DIAGNOSIS — Z13.21 ENCOUNTER FOR VITAMIN DEFICIENCY SCREENING: ICD-10-CM

## 2018-08-13 DIAGNOSIS — Z13.1 SCREENING FOR DIABETES MELLITUS: ICD-10-CM

## 2018-08-13 DIAGNOSIS — Z13.89 SCREENING FOR BLOOD OR PROTEIN IN URINE: ICD-10-CM

## 2018-08-13 DIAGNOSIS — Z13.228 SCREENING FOR ENDOCRINE, NUTRITIONAL, METABOLIC AND IMMUNITY DISORDER: ICD-10-CM

## 2018-08-13 NOTE — PROGRESS NOTES
PRE-VISIT PLANNING:     Future Appointments  Date Time Provider Barb Sainzi   2018 2:30 PM Martita Rivera MD [address] [place] [address] (PCP is Martita Rivera MD) is scheduled for an office visit with Martita Rivera MD on 2018 for annual preventive visit. Encounter opened prior to visit to complete pre-visit planning, update and review pertinent sections in the chart. Last office visit with PCP was 18. Rooming Nurse Items:  -- Release for last Pap smear report - done in March, signing release  -- Offer flu shot to patient per office policy. Document in nursing note if clinic does not have flu shot in stock, if patient declines or if patient reports having this season's flu shot administered elsewhere (please note both date of admin and clinic/pharmacy pt reports provided vaccine). Pending items for provider to review with patient:  -- Completed fasting labs   Health Maintenance Due   Topic Date Due    PAP AKA CERVICAL CYTOLOGY  2018    Influenza Age 5 to Adult  2018          [facility] [place] [address] [facility] [address] [facility]  Phone [phone]; Fax [phone]    Date of Service:  2018  Patient's Name & : Chepe Matt 1990    Assessment/Plan:       Chepe Matt was seen today for annual preventive visit. Flu shot in the fall  Patient to bring in school physical forms when she returns for fasting labs and Hep B titer  HM recommendations reviewed with patient. Body mass index is 38.4 kg/(m^2). Weight is down 10#s in past 10 months.   Suggested reading material re: goal setting and making changes  Change in Patient Weight by Encounter (365 Day Lookback)    (2018) Office Visit  Last Recorded Weight: 117.9 kg (260 lb)             Percent Change: -5.15%   [2018 to 2018 (200 Days)]    (01/25/2018) Office Visit  Last Recorded Weight: 124.3 kg (274 lb)             Percent Change: -1.97%   [12/19/2017 to 01/25/2018 (37 Days)]    (12/19/2017) Office Visit  Last Recorded Weight: 126.8 kg (279 lb 9.6 oz)     Ideal body weight: 145 lb 15.1 oz (66.2 kg)  Adjusted ideal body weight: 191 lb 9.1 oz (86.9 kg)    Discussed lifestyle modifications with focus on diet. Follow up weight/BMI at next visit. RTC in 6 months for med refills   The patient states understanding of recommended treatment plan. Orders Placed This Encounter    CBC WITH AUTOMATED DIFF    METABOLIC PANEL, COMPREHENSIVE    HEMOGLOBIN A1C WITH EAG    LIPID PANEL    TSH AND FREE T4    URINALYSIS W/ RFLX MICROSCOPIC    VITAMIN D, 25 HYDROXY    HEP B SURFACE AB    AMB POC TUBERCULOSIS, INTRADERMAL (SKIN TEST)       Patient Instructions     Great job with weight loss - keep up the good work    PPD read to be done between 3pm Wed and 3pm Thurs    Return for step 2 ~ August 27th     Fasting labs 1-2 weeks prior (please call to confirm orders/lab hours, lab hours 7a-3p M-F). Follow up with Miguelito Chapa MD in 6 months (30m). Arrive 15 minutes prior to scheduled appointment time for check-in. Call and request an earlier appointment if you have questions or concerns. A HEALTHY LIFESTYLE IS RECOMMENDED FOR EVERYONE! Your doctor recommends a lifestyle that incorporates daily exercise and a diet focused on whole, unprocessed foods. Aim to follow a diet of 2/3 non-starchy vegetables and low glycemic impact fruits and 1/3 lean proteins. Avoid processed/refined carbohydrates (especially bread products, bakery items, sugary drinks, cereals, crackers and sweets). Minimum 30 minutes of cardio and weight training/resistance exercise daily to build muscle, reduce insulin sensitivity and increase resting fat & calorie burning capacity.      TESTING RESULTS   Normal results will be released to [employer] or sent by US mail. [address] [facility] #[phone]. Results of tests performed at outside facilities will not appear in [address] If you have questions about your results, please feel free to schedule a follow up appointment to discuss your concerns with your PCP. MEDICATION REFILLS      -- Medication refills should be requested at least 2 business days in advance through your pharmacy. Refills will not be provided by the after hours/on call provider. Molly Crandall MD - Internal Medicine  8/15/2018, 2:46 PM    Subjective/Discussion:       CHIEF COMPLAINT:  Preventive visit/CPE    Alla Byrne is a 29 y.o. female presenting today for annual preventive visit. Doing [employer] supplements and working out more   Mood has been good, stress levels have improved for the most part   Requests PPD placement (needs updated 2 step PPD for school, states she can have PPD read on Wed at work), wasn't able to print her school physical form before appt, will drop it off for completion later     Discussed regular exercise: Y  Discussed healthy diet:  Y  Discussed sun protection:  Y  Discussed always wearing seatbelt in automobile:  Y  Discussed distracted driving, advised not to text while driving:  Y  Change to family history: N       Health Maintenance   Topic Date Due    PAP AKA CERVICAL CYTOLOGY  04/16/2018    Influenza Age 5 to Adult  08/01/2018    DTaP/Tdap/Td series (2 - Td) 07/14/2027     Patient Active Problem List    Diagnosis    Adjustment insomnia    BMI 40.0-44.9, adult ([facility]    Borderline high LDL cholesterol of 126     10 year CVD risk score 0.3% as of 4/20/17      Chronic depression     Sertraline (Zoloft) 200 mg daily      Migraine without aura or status migrainosus     Referred to neurology in 2014 after tx failures on Maxalt and Relpax and propranolol 60 mg daily;  Patient now on verapamil, alternating Imitrex and Maxalt PRN; also PRN phenergan for nausea       Review of Systems Constitutional: Negative for chills, diaphoresis, fever, malaise/fatigue and weight loss. HENT: Negative for congestion, ear discharge, ear pain, hearing loss, nosebleeds, sinus pain, sore throat and tinnitus. Eyes: Negative for blurred vision, double vision, photophobia, pain, discharge and redness. Respiratory: Negative for cough, hemoptysis, sputum production, shortness of breath, wheezing and stridor. Cardiovascular: Negative for chest pain, palpitations, orthopnea, claudication, leg swelling and PND. Gastrointestinal: Negative for abdominal pain, blood in stool, constipation, diarrhea, heartburn, melena, nausea and vomiting. Genitourinary: Negative for dysuria, flank pain, frequency, hematuria and urgency. Musculoskeletal: Negative for back pain, falls, joint pain, myalgias and neck pain. Skin: Negative for itching and rash. Neurological: Negative for dizziness, tingling, tremors, sensory change, speech change, focal weakness, seizures, loss of consciousness, weakness and headaches. Endo/Heme/Allergies: Negative for polydipsia. Does not bruise/bleed easily. Psychiatric/Behavioral: Negative for depression, hallucinations, memory loss, substance abuse and suicidal ideas. The patient is not nervous/anxious and does not have insomnia. Objective:     /77 (BP 1 Location: Right arm, BP Patient Position: Sitting)  Pulse 91  Temp 97.7 °F (36.5 °C) (Oral)   Resp 20  Ht 5' 9\" (1.753 m)  Wt 260 lb (117.9 kg)  LMP 07/31/2018  SpO2 97%  BMI 38.4 kg/m2    Physical Exam   Constitutional: She is oriented to person, place, and time. She appears well-developed and well-nourished. No distress. HENT:   Head: Normocephalic and atraumatic.    Right Ear: Tympanic membrane, external ear and ear canal normal.   Left Ear: Tympanic membrane, external ear and ear canal normal.   Nose: Nose normal.   Mouth/Throat: Oropharynx is clear and moist and mucous membranes are normal. No oropharyngeal exudate. Eyes: Conjunctivae, EOM and lids are normal. Pupils are equal, round, and reactive to light. Right eye exhibits no discharge. Left eye exhibits no discharge. No scleral icterus. Neck: Normal range of motion. Neck supple. No JVD present. No thyromegaly present. Cardiovascular: Normal rate, regular rhythm, normal heart sounds and intact distal pulses. Exam reveals no gallop and no friction rub. No murmur heard. Pulmonary/Chest: Effort normal and breath sounds normal. No stridor. No respiratory distress. She has no wheezes. She has no rales. She exhibits no tenderness. Abdominal: Soft. Bowel sounds are normal. She exhibits no distension and no mass. There is no tenderness. There is no rebound and no guarding. Musculoskeletal: Normal range of motion. She exhibits no edema, tenderness or deformity. Neurological: She is alert and oriented to person, place, and time. She displays no atrophy and no tremor. No cranial nerve deficit. She exhibits normal muscle tone. Coordination and gait normal.   Skin: Skin is warm and dry. No rash noted. She is not diaphoretic. No erythema. No pallor. Tanned skin   Psychiatric: She has a normal mood and affect. Her behavior is normal. Judgment and thought content normal.         Laboratory/Testing Results:     No visits with results within 2 Week(s) from this visit. Latest known visit with results is:    Office Visit on 01/25/2018   Component Date Value Ref Range Status    HIV -1/0/2 AG/AB WITH REFLEX 01/25/2018 Non Reactive  Non Reacti Final    HIV INTERPRETATION 01/25/2018 HIV-1 antigen and HIV 1/2 antibodies not detected. No laboratory evidence of   Final    Comment: HIV infection. If acute HIV infection is suspected, consider testing for   HIV-1  RNA by PCR.  Chlamydia amplified urine 01/25/2018 Negative  Negative Final    GC Amplified urine 01/25/2018 Negative  Negative Final    Comment:  This assay was developed, and its performance characteristics were determined  by the Serology Laboratory of [facility]. This test is not  FDA-approved for female urine for CT/NG testing. This specimen type was  validated in the Serology Department. This assay/method meets or exceeds the   standards established by CLIA. Documentation is on file at  [facility]. This test is used for clinical  purposes. It should not be regarded as investigational or for research.       Hep C Virus Ab 01/25/2018 None Detected  None Detec Final    HSV IGM I/II Combination Ab 01/25/2018 1.84* 0.00 - 0.9 Ratio Final    Comment:                                  Negative        <0.91                                   Equivocal 0.91 - 1.09                                   Positive        >1.09  Performed at:  [address]  [id number]  : Melinda Garcia MD, Phone:  [phone]      Treponema pallidum Ab 01/25/2018 Non Reactive  Non Reacti Final    Hep B surface Ag screen 01/25/2018 None Detected  None Detec Final       Additional History     Past Medical History:   Diagnosis Date    Adjustment insomnia 1/25/2018    BMI 33.0-33.9,adult 1/2/14    BMI 39.0-39.9,adult 1/2/2014 4/22/17 - BMI 37.66    [facility] 40.0-44.9[facility] 12/19/2017    Borderline high LDL cholesterol of 126 04/20/2017    10 year CVD risk score 0.3% as of 4/20/17    Depression     H/O echocardiogram 12/5/14    Normal ([facility]), negative bubble study    Headache     Headache(784.0)     Zolmitriptan ineffective, Sumatriptan partially effective    History of pneumonia 11/25/2016    Hx of seizure disorder 18 month old    Seizures stopped after bilateral ureteral transplants    Left facial numbness 11/8/2014    Obstructive sleep apnea (adult) (pediatric)     Mild, AHI 8    Posttraumatic stress disorder      Past Surgical History:   Procedure Laterality Date    HX HEENT  08/2017    tonsilectomy    HX OTHER SURGICAL Bilateral 1992    Bilateral ureteral transplants done at < 2 years     Social History   Substance Use Topics    Smoking status: Former Smoker     Types: Cigarettes    Smokeless tobacco: Never Used    Alcohol use Yes      Comment: on occasion     Current Outpatient Prescriptions   Medication Sig    sertraline (ZOLOFT) 100 mg tablet TAKE 2 TABLETS BY MOUTH DAILY.  SUMAtriptan (IMITREX) 100 mg tablet TAKE 1 TABLET BY MOUTH AS NEEDED FOR MIGRAINE FOR UP TO 1 DOSE    verapamil ER (CALAN-SR) 240 mg CR tablet Take 1 Tab by mouth nightly. 30 day refill until established with new neurologist    amitriptyline (ELAVIL) 10 mg tablet Take 10 mg by mouth nightly.  ondansetron hcl (ZOFRAN, AS HYDROCHLORIDE,) 4 mg tablet Take 4 mg by mouth every eight (8) hours as needed for Nausea.  LORazepam (ATIVAN) 1 mg tablet Take 1 Tab by mouth nightly as needed for Anxiety. Max Daily Amount: 1 mg.  ZOLMitriptan (ZOMIG-ZMT) 5 mg disintegrating tablet Take 1 Tab by mouth as needed for Migraine.  promethazine (PHENERGAN) 25 mg tablet Take 1 Tab by mouth every eight (8) hours as needed for Nausea.  Nebulizer & Compressor machine Wuse nebulizer with albuterol solution up to every 4 hours if needed for cough, wheezing    albuterol (PROVENTIL VENTOLIN) 2.5 mg /3 mL (0.083 %) nebulizer solution Use with nebulizer up to every 4 hours as needed    albuterol (PROAIR HFA) 90 mcg/actuation inhaler Take  by inhalation.  acetaminophen (TYLENOL) 325 mg tablet Take  by mouth every four (4) hours as needed for Pain.  Biotin 2,500 mcg cap Take  by mouth.  cholecalciferol, VITAMIN D3, (VITAMIN D3) 5,000 unit tab tablet Take  by mouth daily.  PNV#16-Iron Fum & PS-FA-OM-3 35-1-200 mg cap Take  by mouth.  riboflavin, vitamin B2, 100 mg tablet Take 400 mg by mouth daily.  gabapentin (NEURONTIN) 300 mg capsule Take 300 mg by mouth three (3) times daily.      No current facility-administered medications for this visit. Allergies   Allergen Reactions    Gluten Other (comments)     migraine    Corn Hives    Naproxen Hives     Tolerates ibuprofen    Strawberry Hives       Encounter Diagnoses:     Encounter Diagnoses     ICD-10-CM ICD-9-CM   1. Routine adult health maintenance Z00.00 V70.0   2. Screening for endocrine, nutritional, metabolic and immunity disorder Z13.29 V77.99    Z13.21     Z13.228     Z13.0    3. Screening for diabetes mellitus Z13.1 V77.1   4. Screening, lipid Z13.220 V77.91   5. Screening for thyroid disorder Z13.29 V77.0   6. Screening for blood or protein in urine Z13.89 V82.9   7. Encounter for vitamin deficiency screening Z13.21 V77.99   8. Need for hepatitis B screening test Z11.59 V73.89   9. Tuberculosis screening Z11.1 V74.1            This document may have been created with the aid of dictation software. Text may contain errors, particularly phonetic errors.

## 2018-08-13 NOTE — PATIENT INSTRUCTIONS
Great job with weight loss - keep up the good work    PPD read to be done between [facility] and 3pm Thurs    Return for step 2 ~ August 27th     Fasting labs 1-2 weeks prior (please call to confirm orders/lab hours, lab hours 7a-3p M-F). Follow up with Clementine Cardoza MD in 6 months (30m). Arrive 15 minutes prior to scheduled appointment time for check-in. Call and request an earlier appointment if you have questions or concerns. A HEALTHY LIFESTYLE IS RECOMMENDED FOR EVERYONE! Your doctor recommends a lifestyle that incorporates daily exercise and a diet focused on whole, unprocessed foods. Aim to follow a diet of 2/3 non-starchy vegetables and low glycemic impact fruits and 1/3 lean proteins. Avoid processed/refined carbohydrates (especially bread products, bakery items, sugary drinks, cereals, crackers and sweets). Minimum 30 minutes of cardio and weight training/resistance exercise daily to build muscle, reduce insulin sensitivity and increase resting fat & calorie burning capacity. TESTING RESULTS   Normal results will be released to [employer] or sent by [address] mail. [address] [facility] #[phone]. Results of tests performed at outside facilities will not appear in [address] If you have questions about your results, please feel free to schedule a follow up appointment to discuss your concerns with your PCP. MEDICATION REFILLS      -- Medication refills should be requested at least 2 business days in advance through your pharmacy. Refills will not be provided by the after hours/on call provider.

## 2018-08-13 NOTE — PROGRESS NOTES
Chepe Matt is a 29 y.o. female (: 1990) presenting to address:    Chief Complaint   Patient presents with    Physical       Vitals:    18 1440   BP: 129/77   Pulse: 91   Resp: 20   Temp: 97.7 °F (36.5 °C)   TempSrc: Oral   SpO2: 97%   Weight: 260 lb (117.9 kg)   Height: 5' 9\" (1.753 m)   PainSc:   0 - No pain   LMP: 2018       Hearing/Vision:   No exam data present    Learning Assessment:     Learning Assessment 2014   PRIMARY LEARNER Patient   HIGHEST LEVEL OF EDUCATION - PRIMARY LEARNER  2 YEARS OF COLLEGE   BARRIERS PRIMARY LEARNER NONE   PRIMARY LANGUAGE ENGLISH   LEARNER PREFERENCE PRIMARY DEMONSTRATION   ANSWERED BY patient   RELATIONSHIP SELF     Depression Screening:     PHQ over the last two weeks 10/29/2014   PHQ Not Done Active Diagnosis of Depression or Bipolar Disorder   Little interest or pleasure in doing things Not at all   Feeling down, depressed, irritable, or hopeless Several days   Total Score PHQ 2 1     Fall Risk Assessment:     Fall Risk Assessment, last 12 mths 10/29/2014   Able to walk? Yes   Fall in past 12 months? No     Abuse Screening:     Abuse Screening Questionnaire 2017   Do you ever feel afraid of your partner? N   Are you in a relationship with someone who physically or mentally threatens you? N   Is it safe for you to go home? Y     Coordination of Care Questionaire:   1. Have you been to the ER, urgent care clinic since your last visit? Hospitalized since your last visit? NO    2. Have you seen or consulted any other health care providers outside of the [facility] since your last visit? Include any pap smears or colon screening. YES Dr. Claudia Riojas    Advanced Directive:   1. Do you have an Advanced Directive? NO    2. Would you like information on Advanced Directives? NO      Patient has not received flu vaccine. Not able to offer due to lack of availability in clinic.

## 2018-08-13 NOTE — LETTER
8/13/2018 3:16 PM 
 
Ms. Seema Rogers [facility] [address] [phone] Seema Rogers had a PPD placement left forearm on 8/13/2018 at 3:15pm.  
 
 
 
Sincerely, Saba Carrion MD

## 2018-08-13 NOTE — MR AVS SNAPSHOT
[address] [place] [address] [address] 8861 [facility] [id number] [phone] Patient: Roxanne Castro MRN: [id number] TDY:6/72/5875 Visit Information Date & Time Provider Department Dept. Phone Encounter #  
 8/13/2018  2:30 PM Sheree Ahumada, 3 [facility] [phone] [id number] Follow-up Instructions Return in about 6 months (around 2/13/2019). Upcoming Health Maintenance Date Due  
 PAP AKA CERVICAL CYTOLOGY 4/16/2018 Influenza Age 5 to Adult 8/1/2018 DTaP/Tdap/Td series (2 - Td) 7/14/2027 Allergies as of 8/13/2018  Review Complete On: 8/13/2018 By: Sheree Ahumada, MD  
  
 Severity Noted Reaction Type Reactions Gluten High 07/18/2014   Intolerance Other (comments)  
 migraine Corn  03/15/2013    Hives Naproxen  03/15/2013    Hives Tolerates ibuprofen [place]  03/15/2013    Hives Current Immunizations  Reviewed on 4/18/2017 Name Date Influenza Vaccine 9/11/2017, 9/23/2016, 9/23/2016, 11/8/2014 Pneumococcal Polysaccharide (PPSV-23) 10/12/2016 TB Skin Test (PPD) 2/7/2017 TB Skin Test (PPD) Intradermal 1/6/2014 Tdap 7/14/2017 Not reviewed this visit You Were Diagnosed With   
  
 Codes Comments Routine adult health maintenance    -  Primary ICD-10-CM: Z00.00 ICD-9-CM: V70.0 Screening for endocrine, nutritional, metabolic and immunity disorder     ICD-10-CM: Z13.29, Z13.21, Z13.228, Z13.0 ICD-9-CM: V77.99 Screening for diabetes mellitus     ICD-10-CM: Z13.1 ICD-9-CM: V77.1 Screening, lipid     ICD-10-CM: X03.102 ICD-9-CM: V77.91 Screening for thyroid disorder     ICD-10-CM: Z13.29 ICD-9-CM: V77.0 Screening for blood or protein in urine     ICD-10-CM: Z13.89 ICD-9-CM: V82.9 Encounter for vitamin deficiency screening     ICD-10-CM: Z13.21 ICD-9-CM: V77.99  Need for hepatitis B screening test     ICD-10-CM: Z11.59 
 ICD-9-CM: V73.89 Vitals BP Pulse Temp Resp Height(growth percentile) Weight(growth percentile) 129/77 (BP 1 Location: Right arm, BP Patient Position: Sitting) 91 97.7 °F (36.5 °C) (Oral) 20 5' 9\" (1.753 m) 260 lb (117.9 kg) LMP SpO2 BMI OB Status Smoking Status 07/31/2018 97% 38.4 kg/m2 Having regular periods Former Smoker BMI and BSA Data Body Mass Index Body Surface Area  
 38.4 kg/m 2 2.4 m 2 [facility]erred Pharm[facility] Phone [facility] [address] [address] 0380 [facility] [phone] Your Updated Medication List  
  
   
This list is accurate as of 8/13/18  3:06 PM.  Always use your most recent med list.  
  
  
  
  
 amitriptyline 10 mg tablet Commonly known as:  ELAVIL Take 10 mg by mouth nightly. Biotin 2,500 mcg Cap Take  by mouth. cholecalciferol (VITAMIN D3) 5,000 unit Tab tablet Commonly known as:  VITAMIN D3 Take  by mouth daily. gabapentin 300 mg capsule Commonly known as:  NEURONTIN Take 300 mg by mouth three (3) times daily. LORazepam 1 mg tablet Commonly known as:  ATIVAN Take 1 Tab by mouth nightly as needed for Anxiety. Max Daily Amount: 1 mg. Nebulizer & Compressor machine Wuse nebulizer with albuterol solution up to every 4 hours if needed for cough, wheezing PNV#16-Iron Fum & PS-FA-OM-3 35-1-200 mg Cap Take  by mouth. * PROAIR HFA 90 mcg/actuation inhaler Generic drug:  albuterol Take  by inhalation. * albuterol 2.5 mg /3 mL (0.083 %) nebulizer solution Commonly known as:  PROVENTIL VENTOLIN Use with nebulizer up to every 4 hours as needed  
  
 promethazine 25 mg tablet Commonly known as:  PHENERGAN Take 1 Tab by mouth every eight (8) hours as needed for Nausea. riboflavin (vitamin B2) 100 mg tablet Take 400 mg by mouth daily. sertraline 100 mg tablet Commonly known as:  ZOLOFT  
TAKE 2 TABLETS BY MOUTH DAILY. SUMAtriptan 100 mg tablet Commonly known as:  IMITREX  
TAKE 1 TABLET BY MOUTH AS NEEDED FOR MIGRAINE FOR UP TO 1 DOSE  
  
 TYLENOL 325 mg tablet Generic drug:  acetaminophen Take  by mouth every four (4) hours as needed for Pain.  
  
 verapamil  mg CR tablet Commonly known as:  CALAN-SR Take 1 Tab by mouth nightly. 30 day refill until established with new neurologist  
  
 ZOFRAN 4 mg tablet Generic drug:  ondansetron hcl Take 4 mg by mouth every eight (8) hours as needed for Nausea. ZOLMitriptan 5 mg disintegrating tablet Commonly known as:  ZOMIG-ZMT Take 1 Tab by mouth as needed for Migraine. * Notice: This list has 2 medication(s) that are the same as other medications prescribed for you. Read the directions carefully, and ask your doctor or other care provider to review them with you. Follow-up Instructions Return in about 6 months (around 2/13/2019). To-Do List   
 08/13/2018 Lab:  HEP B SURFACE AB   
  
 08/14/2018 Lab:  CBC WITH AUTOMATED DIFF   
  
 08/14/2018 Lab:  HEMOGLOBIN A1C WITH EAG   
  
 08/14/2018 Lab:  LIPID PANEL   
  
 08/14/2018 Lab:  METABOLIC PANEL, COMPREHENSIVE   
  
 08/14/2018 Lab:  TSH AND FREE T4   
  
 08/14/2018 Lab:  URINALYSIS W/ RFLX MICROSCOPIC   
  
 08/14/2018 Lab:  VITAMIN D, 25 HYDROXY Patient Instructions Great job with weight loss - keep up the good work PPD read to be done between 3pm Wed and 3pm Thurs Return for step 2 ~ August 27th Fasting labs 1-2 weeks prior (please call to confirm orders/lab hours, lab hours 7a-3p M-F). Follow up with Rhys Pettit MD in 6 months (30m). Arrive 15 minutes prior to scheduled appointment time for check-in. Call and request an earlier appointment if you have questions or concerns. A HEALTHY LIFESTYLE IS RECOMMENDED FOR EVERYONE!    
 
Your doctor recommends a lifestyle that incorporates daily exercise and a diet focused on whole, unprocessed foods. Aim to follow a diet of 2/3 non-starchy vegetables and low glycemic impact fruits and 1/3 lean proteins. Avoid processed/refined carbohydrates (especially bread products, bakery items, sugary drinks, cereals, crackers and sweets). Minimum 30 minutes of cardio and weight training/resistance exercise daily to build muscle, reduce insulin sensitivity and increase resting fat & calorie burning capacity. TESTING RESULTS Normal results will be released to [employer] or sent by [address] mail. [address] [facility] #[phone]. Results of tests performed at outside facilities will not appear in [address] If you have questions about your results, please feel free to schedule a follow up appointment to discuss your concerns with your PCP. MEDICATION REFILLS   
 
-- Medication refills should be requested at least 2 business days in advance through your pharmacy. Refills will not be provided by the after hours/on call provider. Introducing [facility] & HEALTH SERVICES! Dear Geovani Ray: Thank you for requesting a Contact[employer] account. Our records indicate that you already have an active ContactPoint account. You can access your account anytime at [email or web address] [email or web address] Did you know that you can access your hospital and ER discharge instructions at any time in ContactPoint? You can also review all of your test results from your hospital stay or ER visit. Additional Information If you have questions, please visit the Frequently Asked Questions section of the ContactPoint website at [email or web address] Remember, [employer]Point is NOT to be used for urgent needs. For medical emergencies, dial 911. Now available from your iPhone and Android! Please provide this summary of care documentation to your next provider. Your primary care clinician is listed as Vitaliy Womack. If you have any questions after today's visit, please call [phone].

## 2018-08-14 DIAGNOSIS — Z13.228 SCREENING FOR ENDOCRINE, NUTRITIONAL, METABOLIC AND IMMUNITY DISORDER: ICD-10-CM

## 2018-08-14 DIAGNOSIS — Z13.21 SCREENING FOR ENDOCRINE, NUTRITIONAL, METABOLIC AND IMMUNITY DISORDER: ICD-10-CM

## 2018-08-14 DIAGNOSIS — Z13.1 SCREENING FOR DIABETES MELLITUS: ICD-10-CM

## 2018-08-14 DIAGNOSIS — Z13.29 SCREENING FOR ENDOCRINE, NUTRITIONAL, METABOLIC AND IMMUNITY DISORDER: ICD-10-CM

## 2018-08-14 DIAGNOSIS — Z00.00 ROUTINE ADULT HEALTH MAINTENANCE: ICD-10-CM

## 2018-08-14 DIAGNOSIS — Z13.0 SCREENING FOR ENDOCRINE, NUTRITIONAL, METABOLIC AND IMMUNITY DISORDER: ICD-10-CM

## 2018-08-14 DIAGNOSIS — Z13.220 SCREENING, LIPID: ICD-10-CM

## 2018-08-14 DIAGNOSIS — Z13.89 SCREENING FOR BLOOD OR PROTEIN IN URINE: ICD-10-CM

## 2018-08-14 DIAGNOSIS — Z13.29 SCREENING FOR THYROID DISORDER: ICD-10-CM

## 2018-08-14 DIAGNOSIS — Z13.21 ENCOUNTER FOR VITAMIN DEFICIENCY SCREENING: ICD-10-CM

## 2018-08-15 LAB
25(OH)D3 SERPL-MCNC: 47.7 NG/ML (ref 32–100)
A-G RATIO,AGRAT: 1.6 RATIO (ref 1.1–2.6)
ABSOLUTE LYMPHOCYTE COUNT, 10803: 1.8 K/UL (ref 1–4.8)
ALBUMIN SERPL-MCNC: 4.3 G/DL (ref 3.5–5)
ALP SERPL-CCNC: 51 U/L (ref 25–115)
ALT SERPL-CCNC: 71 U/L (ref 5–40)
ANION GAP SERPL CALC-SCNC: 16 MMOL/L
AST SERPL W P-5'-P-CCNC: 41 U/L (ref 10–37)
AVG GLU, 10930: 90 MG/DL (ref 91–123)
BACTERIA,BACTU: PRESENT
BASOPHILS # BLD: 0 K/UL (ref 0–0.2)
BASOPHILS NFR BLD: 0 % (ref 0–2)
BILIRUB SERPL-MCNC: 0.3 MG/DL (ref 0.2–1.2)
BILIRUB UR QL: NEGATIVE
BUN SERPL-MCNC: 13 MG/DL (ref 6–22)
CALCIUM SERPL-MCNC: 9.2 MG/DL (ref 8.4–10.5)
CHLORIDE SERPL-SCNC: 101 MMOL/L (ref 98–110)
CHOLEST SERPL-MCNC: 149 MG/DL (ref 110–200)
CO2 SERPL-SCNC: 23 MMOL/L (ref 20–32)
CREAT SERPL-MCNC: 0.5 MG/DL (ref 0.5–1.2)
EOSINOPHIL # BLD: 0.3 K/UL (ref 0–0.5)
EOSINOPHIL NFR BLD: 4 % (ref 0–6)
EPITHELIAL,EPSU: ABNORMAL /HPF (ref 0–2)
ERYTHROCYTE [DISTWIDTH] IN BLOOD BY AUTOMATED COUNT: 14.3 % (ref 10–15.5)
GFRAA, 66117: >60
GFRNA, 66118: >60
GLOBULIN,GLOB: 2.7 G/DL (ref 2–4)
GLUCOSE SERPL-MCNC: 95 MG/DL (ref 70–99)
GLUCOSE UR QL: NEGATIVE MG/DL
GRANULOCYTES,GRANS: 67 % (ref 40–75)
HBA1C MFR BLD HPLC: 4.8 % (ref 4.8–5.9)
HCT VFR BLD AUTO: 39.6 % (ref 35.1–46.5)
HDLC SERPL-MCNC: 2.7 MG/DL (ref 0–5)
HDLC SERPL-MCNC: 55 MG/DL (ref 40–59)
HGB BLD-MCNC: 12.4 G/DL (ref 11.7–15.5)
HGB UR QL STRIP: NEGATIVE
KETONES UR QL STRIP.AUTO: NEGATIVE MG/DL
LDLC SERPL CALC-MCNC: 76 MG/DL (ref 50–99)
LEUKOCYTE ESTERASE: NEGATIVE
LYMPHOCYTES, LYMLT: 26 % (ref 20–45)
MCH RBC QN AUTO: 30 PG (ref 26–34)
MCHC RBC AUTO-ENTMCNC: 31 G/DL (ref 31–36)
MCV RBC AUTO: 95 FL (ref 80–95)
MONOCYTES # BLD: 0.2 K/UL (ref 0.1–1)
MONOCYTES NFR BLD: 3 % (ref 3–12)
NEUTROPHILS # BLD AUTO: 4.7 K/UL (ref 1.8–7.7)
NITRITE UR QL STRIP.AUTO: NEGATIVE
PH UR STRIP: 7 PH (ref 5–8)
PLATELET # BLD AUTO: 280 K/UL (ref 140–440)
PMV BLD AUTO: 11 FL (ref 9–13)
POTASSIUM SERPL-SCNC: 4.3 MMOL/L (ref 3.5–5.5)
PROT SERPL-MCNC: 7 G/DL (ref 6.4–8.3)
PROT UR QL STRIP: NEGATIVE MG/DL
RBC # BLD AUTO: 4.18 M/UL (ref 3.8–5.2)
RBC #/AREA URNS HPF: ABNORMAL /HPF
SODIUM SERPL-SCNC: 140 MMOL/L (ref 133–145)
SOURCE OF URINE, 17028: ABNORMAL
SP GR UR: 1.01 (ref 1–1.03)
T4 FREE SERPL-MCNC: 1.1 NG/DL (ref 0.9–1.8)
TRIGL SERPL-MCNC: 91 MG/DL (ref 40–149)
TSH SERPL DL<=0.005 MIU/L-ACNC: 1.53 MCU/ML (ref 0.27–4.2)
UROBILINOGEN UR STRIP-MCNC: <2 MG/DL
VLDLC SERPL CALC-MCNC: 18 MG/DL (ref 8–30)
WBC # BLD AUTO: 7 K/UL (ref 4–11)
WBC URNS QL MICRO: ABNORMAL /HPF (ref 0–2)

## 2018-08-20 LAB — HBV SURFACE AB SER RIA-ACNC: DETECTED

## 2018-09-04 ENCOUNTER — CLINICAL SUPPORT (OUTPATIENT)
Dept: FAMILY MEDICINE CLINIC | Age: 28
End: 2018-09-04

## 2018-09-04 DIAGNOSIS — Z11.1 SCREENING FOR TUBERCULOSIS: Primary | ICD-10-CM

## 2018-09-04 LAB
MM INDURATION POC: NORMAL MM (ref 0–5)
PPD POC: NORMAL NEGATIVE

## 2018-09-04 NOTE — PROGRESS NOTES
Tuberculin skin test applied to right ventral forearm. Explained how to read the test, measuring induration not just erythema; she will come into office if test appears positive.

## 2018-09-04 NOTE — MR AVS SNAPSHOT
[address] 2[address] [facility] [address] [phone] Patient: Mariia Carrasco MRN: [id number] JMF:0/41/1632 Visit Information Date & Time Provider Department Dept. Phone Encounter #  
 9/4/2018  2:20 PM Kaleb Davenport [facility] [phone] [id number] Upcoming Health Maintenance Date Due Influenza Age 5 to Adult 8/1/2018 PAP AKA CERVICAL CYTOLOGY 2/28/2021 DTaP/Tdap/Td series (2 - Td) 7/14/2027 Allergies as of 9/4/2018  Review Complete On: 9/4/2018 By: Jones Cross LPN Severity Noted Reaction Type Reactions Gluten High 07/18/2014   Intolerance Other (comments)  
 migraine Corn  03/15/2013    Hives Naproxen  03/15/2013    Hives Tolerates ibuprofen [place]  03/15/2013    Hives Current Immunizations  Reviewed on 8/14/2018 Name Date Influenza Vaccine 9/11/2017, 9/23/2016, 9/23/2016, 11/8/2014 Pneumococcal Polysaccharide (PPSV-23) 10/12/2016 TB Skin Test (PPD) 2/7/2017 TB Skin Test (PPD) Intradermal 9/4/2018, 8/15/2018, 1/6/2014 Tdap 7/14/2017 Not reviewed this visit You Were Diagnosed With   
  
 Codes Comments Screening for tuberculosis    -  Primary ICD-10-CM: Z11.1 ICD-9-CM: V74.1 Vitals OB Status Smoking Status Having regular periods Former Smoker [facility] Phone [facility] 20[address] [place] [address] [phone] Your Updated Medication List  
  
   
This list is accurate as of 9/4/18  2:36 PM.  Always use your most recent med list.  
  
  
  
  
 amitriptyline 10 mg tablet Commonly known as:  ELAVIL Take 10 mg by mouth nightly. Biotin 2,500 mcg Cap Take  by mouth. cholecalciferol (VITAMIN D3) 5,000 unit Tab tablet Commonly known as:  VITAMIN D3 Take  by mouth daily. gabapentin 300 mg capsule Commonly known as:  NEURONTIN Take 300 mg by mouth three (3) times daily. LORazepam 1 mg tablet Commonly known as:  ATIVAN Take 1 Tab by mouth nightly as needed for Anxiety. Max Daily Amount: 1 mg. Nebulizer & Compressor machine Wuse nebulizer with albuterol solution up to every 4 hours if needed for cough, wheezing PNV#16-Iron Fum & PS-FA-OM-3 35-1-200 mg Cap Take  by mouth. * PROAIR HFA 90 mcg/actuation inhaler Generic drug:  albuterol Take  by inhalation. * albuterol 2.5 mg /3 mL (0.083 %) nebulizer solution Commonly known as:  PROVENTIL VENTOLIN Use with nebulizer up to every 4 hours as needed  
  
 promethazine 25 mg tablet Commonly known as:  PHENERGAN Take 1 Tab by mouth every eight (8) hours as needed for Nausea. riboflavin (vitamin B2) 100 mg tablet Take 400 mg by mouth daily. sertraline 100 mg tablet Commonly known as:  ZOLOFT  
TAKE 2 TABLETS BY MOUTH DAILY. SUMAtriptan 100 mg tablet Commonly known as:  IMITREX  
TAKE 1 TABLET BY MOUTH AS NEEDED FOR MIGRAINE FOR UP TO 1 DOSE  
  
 TYLENOL 325 mg tablet Generic drug:  acetaminophen Take  by mouth every four (4) hours as needed for Pain.  
  
 verapamil  mg CR tablet Commonly known as:  CALAN-SR Take 1 Tab by mouth nightly. 30 day refill until established with new neurologist  
  
 ZOFRAN 4 mg tablet Generic drug:  ondansetron hcl Take 4 mg by mouth every eight (8) hours as needed for Nausea. ZOLMitriptan 5 mg disintegrating tablet Commonly known as:  ZOMIG-ZMT Take 1 Tab by mouth as needed for Migraine. * Notice: This list has 2 medication(s) that are the same as other medications prescribed for you. Read the directions carefully, and ask your doctor or other care provider to review them with you. We Performed the Following AMB POC TUBERCULOSIS, INTRADERMAL (SKIN TEST) [25521 CPT(R)] Introducing [facility] & HEALTH SERVICES! Dear Becyk Veloz: Thank you for requesting a [employer] account. Our records indicate that you already have an active [employer] account. You can access your account anytime at [email or web address] Did you know that you can access your hospital and ER discharge instructions at any time in [employer]? You can also review all of your test results from your hospital stay or ER visit. Additional Information If you have questions, please visit the Frequently Asked Questions section of the [employer] website at [email or web address] Remember, [employer] is NOT to be used for urgent needs. For medical emergencies, dial 911. Now available from your iPhone and Android! Please provide this summary of care documentation to your next provider. Your primary care clinician is listed as Vickie Wilcox. If you have any questions after today's visit, please call [phone].

## 2019-01-18 RX ORDER — SERTRALINE HYDROCHLORIDE 100 MG/1
TABLET, FILM COATED ORAL
Qty: 60 TAB | Refills: 5 | Status: SHIPPED | OUTPATIENT
Start: 2019-01-18 | End: 2019-08-01 | Stop reason: SDUPTHER

## 2019-06-10 NOTE — PATIENT INSTRUCTIONS
STAY UP TO DATE WITH YOUR PREVENTIVE HEALTH:     Please review the following recommendations and if you are not sure that your healthcare is up to date, ask your provider at your next scheduled office visit. -- Yearly preventive visits (sometimes called \"physicals\") are recommended for all adults. Medicare and Medicaid do not pay for physicals. Medicare covers a yearly wellness visit that differs in many ways from a traditional physical, but is an opportunity to make sure you are maximizing the preventive services that will be covered by Medicare. Each insurance carrier will have different regulations about what services may be covered, so be sure to talk with your insurance provider before scheduling a visit. -- Colon cancer screening is recommended for all patients 48 and older with either colonoscopy (interval will vary depending on results) or yearly stool testing.      -- Mammogram is recommended every 2 years for women ages 36 to 76. -- Pap smear is recommended every 3-5 years for women aged 24 to 72, unless your cervix has been surgically removed for benign reasons. -- Flu shot is recommended every fall for adults of all ages. -- Prevnar 15 is recommended at the age of 72 for all adults. -- Pneumovax is recommended one year after Prevnar 13 for all adults, but earlier if you have a history of chronic health problems (including diabetes and asthma) or smoking. -- Tetanus boosters are recommended every 10 years for adults of all ages. Medicare and Medicaid do not cover tetanus as a preventive booster, but will pay for patients to receive a booster in the event of certain injuries. INSTRUCTIONS AFTER YOUR VISIT ON 4/20/2017     -- LAB WORK was ordered for today. Sign in for the lab at the . Results are generally reviewed within 10-14 days. -- X-RAYS were ordered today. The  will direct you to our X-ray suite.   The radiologist will provide a report in 24-48 hours. If you want a copy of your images, you can request a CD copy from the X-ray technician. TESTING RESULTS   -- Lab results may become available for your review on [employer] before your provider has an opportunity to write you a note about results. Review of routine lab results will generally be done in 10-14 days. Please save your inquiries about results until your provider has had time to review them. -- If you have testing done outside of the office, please call to let us know the date and location that your testing was completed. Results can often be obtained faster if an inquiry is run. MEDICATION REFILLS      -- Controlled substance refills (medications that require printed prescriptions for monitoring of use per [facility] guidelines) must be picked up in the office and per medical group policy will require a scheduled office visit with the provider. Calling the after hours answering service to request a controlled substance represents a violation of [facility] controlled substance policy. -- All other medication refills are to be requested by calling your pharmacy during regular office hours. The after hours physician on call is not required to respond to calls about medication refills. It is your responsibility to keep track of when you may be running out of medication and to place refill requests at least 3 business days prior. [facility]      Scheduling appointments can be done at the  or by calling [phone] and selecting option #1. HOLIDAY CLOSURES:     The office is closed on six major holidays each year:  New Year's Day, July 4th, Memorial Day, Labor Day, Thanksgiving, and [place] Day. Lab and X-ray services are not available on those days. Back pain

## 2019-07-31 RX ORDER — SERTRALINE HYDROCHLORIDE 100 MG/1
TABLET, FILM COATED ORAL
Qty: 60 TAB | Refills: 0 | OUTPATIENT
Start: 2019-07-31

## 2019-07-31 NOTE — TELEPHONE ENCOUNTER
Last seen 8/13/18    Last filled 1/18/19 qty 60 w/ 5 refills     Denied patient needs to establish w/ PCP    No future appointments.

## 2019-08-02 RX ORDER — SERTRALINE HYDROCHLORIDE 100 MG/1
TABLET, FILM COATED ORAL
Qty: 60 TAB | Refills: 0 | Status: SHIPPED | OUTPATIENT
Start: 2019-08-02 | End: 2019-08-15 | Stop reason: SDUPTHER

## 2019-08-02 NOTE — TELEPHONE ENCOUNTER
Last seen 8/13/18    Last filled 1/18/19  Qty 60 w/ 5 refills     Future Appointments   Date Time Provider Barb Kimberly   8/15/2019  9:30 AM [facility]

## 2019-08-02 NOTE — TELEPHONE ENCOUNTER
Needs to establish care with a new provider prior to additional refills also has not been seen here in 12 months

## 2019-08-14 NOTE — PROGRESS NOTES
HISTORY OF PRESENT ILLNESS  Yennifer Orona is a 34 y.o. female. Ms. Ed Gaviria presents to establish care following the departure of her previous PCP from the practice. She is here for annual physical exam as well as for follow-up of depression. Establish Care   The history is provided by the patient and medical records. Associated symptoms include headaches (migraine headaches doing well now). Pertinent negatives include no chest pain, no abdominal pain and no shortness of breath. Physical   The history is provided by the patient and medical records. Associated symptoms include headaches (migraine headaches doing well now). Pertinent negatives include no chest pain, no abdominal pain and no shortness of breath. Depression   The history is provided by the patient and medical records. This is a chronic problem. Associated symptoms include headaches (migraine headaches doing well now). Pertinent negatives include no chest pain, no abdominal pain and no shortness of breath.      Mr#: [id number]      Past Medical History:   Diagnosis Date    Adjustment insomnia 1/25/2018    BMI 33.0-33.9,adult 1/2/14    BMI 39.0-39.9,adult 1/2/2014 4/22/17 - BMI 37.66    [facility] 40.[facility]44[facility] 12/19/2017    Borderline high LDL cholesterol of 126 04/20/2017    10 year CVD risk score 0.3% as of 4/20/17    Depression     H/O echocardiogram 12/5/14    Normal ([facility]), negative bubble study    Headache     Headache(784.0)     Zolmitriptan ineffective, Sumatriptan partially effective    History of pneumonia 11/25/2016    Hx of seizure disorder 18 month old    Seizures stopped after bilateral ureteral transplants    Left facial numbness 11/8/2014    Obstructive sleep apnea (adult) (pediatric)     Mild, AHI 8    Posttraumatic stress disorder        Past Surgical History:   Procedure Laterality Date    HX HEENT  08/2017    tonsilectomy    HX OTHER SURGICAL Bilateral 1992    Bilateral ureteral transplants done at < 2 years       Family History   Problem Relation Age of Onset   [facility] Migraines Mother     Alcohol abuse Mother     Migraines Sister     Cancer Sister     Alcohol abuse Maternal Aunt     Cancer Maternal Aunt     Migraines Maternal Grandmother     Cancer Maternal Grandmother        Allergies   Allergen Reactions    Gluten Other (comments)     migraine    Corn Hives    Naproxen Hives     Tolerates ibuprofen    Strawberry Hives       Social History     Tobacco Use   Smoking Status Former Smoker    Types: Cigarettes   Smokeless Tobacco Never Used       Social History     Substance and Sexual Activity   Alcohol Use Yes    Comment: on occasion       Health Maintenance Review:  Immunization History   Administered Date(s) Administered    Influenza Vaccine 11/08/2014, 09/23/2016, 09/23/2016, 09/11/2017, 10/10/2018    Influenza Vaccine ([facility]) 09/01/2017    Pneumococcal Polysaccharide (PPSV-23) 10/12/2016    TB Skin Test (PPD) 02/07/2017    TB Skin Test (PPD) Intradermal 01/06/2014, 08/15/2018, 09/04/2018    Tdap 07/14/2017   Pap smear - 2018      Patient Active Problem List   Diagnosis Code    Chronic depression F32.9    Migraine without aura or status migrainosus G43.009    Borderline high LDL cholesterol of 126 E78.9    BMI 40.0-44.9, adult ([facility]) Z68.41    Adjustment insomnia F51.02         Current Outpatient Medications:     rizatriptan (MAXALT) 10 mg tablet, Take 10 mg by mouth once as needed for Migraine. May repeat in 2 hours if needed, Disp: , Rfl:     erenumab-aooe (AIMOVIG AUTOINJECTOR) 70 mg/mL injection, 70 mg by SubCUTAneous route once., Disp: , Rfl:     sertraline (ZOLOFT) 100 mg tablet, TAKE 2 TABLETS BY MOUTH DAILY. , Disp: 180 Tab, Rfl: 3    verapamil ER (CALAN-SR) 240 mg CR tablet, Take 1 Tab by mouth nightly.  30 day refill until established with new neurologist, Disp: 30 Tab, Rfl: 0    amitriptyline (ELAVIL) 10 mg tablet, Take 10 mg by mouth nightly., Disp: , Rfl:     ondansetron hcl (ZOFRAN, AS HYDROCHLORIDE,) 4 mg tablet, Take 4 mg by mouth every eight (8) hours as needed for Nausea., Disp: , Rfl:     LORazepam (ATIVAN) 1 mg tablet, Take 1 Tab by mouth nightly as needed for Anxiety. Max Daily Amount: 1 mg., Disp: 30 Tab, Rfl: 0    ZOLMitriptan (ZOMIG-ZMT) 5 mg disintegrating tablet, Take 1 Tab by mouth as needed for Migraine. , Disp: 12 Tab, Rfl: 5    promethazine (PHENERGAN) 25 mg tablet, Take 1 Tab by mouth every eight (8) hours as needed for Nausea., Disp: 20 Tab, Rfl: 5    Nebulizer & Compressor machine, Wuse nebulizer with albuterol solution up to every 4 hours if needed for cough, wheezing, Disp: 1 Each, Rfl: 0    albuterol (PROVENTIL VENTOLIN) 2.5 mg /3 mL (0.083 %) nebulizer solution, Use with nebulizer up to every 4 hours as needed, Disp: 24 Each, Rfl: 1    albuterol (PROAIR HFA) 90 mcg/actuation inhaler, Take  by inhalation. , Disp: , Rfl:     acetaminophen (TYLENOL) 325 mg tablet, Take  by mouth every four (4) hours as needed for Pain., Disp: , Rfl:     Biotin 2,500 mcg cap, Take  by mouth., Disp: , Rfl:     cholecalciferol, VITAMIN D3, (VITAMIN D3) 5,000 unit tab tablet, Take  by mouth daily. , Disp: , Rfl:     PNV#16-Iron Fum & PS-FA-OM-3 35-1-200 mg cap, Take  by mouth., Disp: , Rfl:     riboflavin, vitamin B2, 100 mg tablet, Take 400 mg by mouth daily. , Disp: , Rfl:          Review of Systems   Constitutional: Negative for chills, fever and weight loss. HENT: Negative for congestion, hearing loss and sore throat. Eyes: Negative for blurred vision and double vision. Corrective lenses   Respiratory: Negative for cough, shortness of breath and wheezing. Cardiovascular: Negative for chest pain, palpitations and leg swelling. Gastrointestinal: Negative for abdominal pain, blood in stool, constipation, diarrhea, heartburn, melena, nausea and vomiting. Genitourinary: Negative for dysuria and urgency. Musculoskeletal: Negative for joint pain and myalgias. Skin: Negative for itching and rash. Neurological: Positive for headaches (migraine headaches doing well now). Negative for dizziness, tingling, sensory change and focal weakness. Endo/Heme/Allergies: Negative for environmental allergies. Psychiatric/Behavioral: Positive for depression ( Feels she is responding to current medication). Negative for suicidal ideas. The patient is not nervous/anxious and does not have insomnia. Visit Vitals  /70 (BP 1 Location: Left arm, BP Patient Position: Sitting)   Pulse 85   Temp 97.7 °F (36.5 °C) (Oral)   Resp 14   Ht 5' 9\" (1.753 m)   Wt 256 lb 12.8 oz (116.5 kg)   LMP 08/12/2019 (Exact Date)   SpO2 97%   BMI 37.92 kg/m²       Physical Exam   Constitutional: She is oriented to person, place, and time. She appears well-developed and well-nourished. HENT:   Head: Normocephalic. Right Ear: Tympanic membrane and ear canal normal.   Left Ear: Tympanic membrane and ear canal normal.   Mouth/Throat: Oropharynx is clear and moist.   Eyes: Pupils are equal, round, and reactive to light. Conjunctivae and EOM are normal.   Neck: Neck supple. Cardiovascular: Normal rate, regular rhythm, normal heart sounds and intact distal pulses. Pulmonary/Chest: Effort normal and breath sounds normal.   Abdominal: Soft. Bowel sounds are normal. She exhibits no mass. There is no tenderness. Musculoskeletal: She exhibits no edema. Neurological: She is alert and oriented to person, place, and time. She has normal reflexes. Skin: Skin is warm and dry. Psychiatric: She has a normal mood and affect. Her behavior is normal.   Nursing note and vitals reviewed. ASSESSMENT and PLAN    ICD-10-CM ICD-9-CM    1.  Routine adult health maintenance Z00.00 V70.0 VITAMIN D, 25 HYDROXY      CBC WITH AUTOMATED DIFF      HEMOGLOBIN A1C WITH EAG      LIPID PANEL      METABOLIC PANEL, COMPREHENSIVE      URINALYSIS W/ RFLX MICROSCOPIC      URINALYSIS W/ RFLX MICROSCOPIC      METABOLIC PANEL, COMPREHENSIVE      LIPID PANEL      HEMOGLOBIN A1C WITH EAG      CBC WITH AUTOMATED DIFF      VITAMIN D, 25 HYDROXY   2. Chronic depression F32.9 311 sertraline (ZOLOFT) 100 mg tablet   3. Migraine without aura and without status migrainosus, not intractable G43.009 346.10    4. Severe obesity (BMI 35.0-39. 9) with comorbidity ([facility] E66.01 278.01    Lab today further disposition pending lab results  Continue current medications  Avoid dietary starch and sugar and follow a program of regular aerobic exercise  Anticipatory guidance and recommendations provided verbally and with printed information. Return for annual physical in 1 year, sooner with any problems.

## 2019-08-15 ENCOUNTER — OFFICE VISIT (OUTPATIENT)
Dept: FAMILY MEDICINE CLINIC | Age: 29
End: 2019-08-15

## 2019-08-15 VITALS
HEIGHT: 69 IN | BODY MASS INDEX: 38.03 KG/M2 | RESPIRATION RATE: 14 BRPM | DIASTOLIC BLOOD PRESSURE: 70 MMHG | TEMPERATURE: 97.7 F | HEART RATE: 85 BPM | OXYGEN SATURATION: 97 % | WEIGHT: 256.8 LBS | SYSTOLIC BLOOD PRESSURE: 122 MMHG

## 2019-08-15 DIAGNOSIS — Z00.00 ROUTINE ADULT HEALTH MAINTENANCE: Primary | ICD-10-CM

## 2019-08-15 DIAGNOSIS — E66.01 SEVERE OBESITY (BMI 35.0-39.9) WITH COMORBIDITY (HCC): ICD-10-CM

## 2019-08-15 DIAGNOSIS — F32.A CHRONIC DEPRESSION: ICD-10-CM

## 2019-08-15 DIAGNOSIS — G43.009 MIGRAINE WITHOUT AURA AND WITHOUT STATUS MIGRAINOSUS, NOT INTRACTABLE: ICD-10-CM

## 2019-08-15 LAB
25(OH)D3 SERPL-MCNC: 61.9 NG/ML (ref 32–100)
A-G RATIO,AGRAT: 1.9 RATIO (ref 1.1–2.6)
ABSOLUTE LYMPHOCYTE COUNT, 10803: 1.5 K/UL (ref 1–4.8)
ALBUMIN SERPL-MCNC: 4.7 G/DL (ref 3.5–5)
ALP SERPL-CCNC: 47 U/L (ref 25–115)
ALT SERPL-CCNC: 24 U/L (ref 5–40)
ANION GAP SERPL CALC-SCNC: 13 MMOL/L
AST SERPL W P-5'-P-CCNC: 23 U/L (ref 10–37)
AVG GLU, 10930: 92 MG/DL (ref 91–123)
BACTERIA,BACTU: PRESENT
BASOPHILS # BLD: 0 K/UL (ref 0–0.2)
BASOPHILS NFR BLD: 1 % (ref 0–2)
BILIRUB SERPL-MCNC: 0.4 MG/DL (ref 0.2–1.2)
BILIRUB UR QL: NEGATIVE
BUN SERPL-MCNC: 10 MG/DL (ref 6–22)
CALCIUM SERPL-MCNC: 10 MG/DL (ref 8.4–10.5)
CHLORIDE SERPL-SCNC: 102 MMOL/L (ref 98–110)
CHOLEST SERPL-MCNC: 159 MG/DL (ref 110–200)
CO2 SERPL-SCNC: 27 MMOL/L (ref 20–32)
CREAT SERPL-MCNC: 0.5 MG/DL (ref 0.5–1.2)
EOSINOPHIL # BLD: 0.2 K/UL (ref 0–0.5)
EOSINOPHIL NFR BLD: 4 % (ref 0–6)
EPITHELIAL,EPSU: ABNORMAL /HPF (ref 0–2)
ERYTHROCYTE [DISTWIDTH] IN BLOOD BY AUTOMATED COUNT: 13.8 % (ref 10–15.5)
GFRAA, 66117: >60
GFRNA, 66118: >60
GLOBULIN,GLOB: 2.5 G/DL (ref 2–4)
GLUCOSE SERPL-MCNC: 84 MG/DL (ref 70–99)
GLUCOSE UR QL: NEGATIVE MG/DL
GRANULOCYTES,GRANS: 62 % (ref 40–75)
HBA1C MFR BLD HPLC: 4.9 % (ref 4.8–5.9)
HCT VFR BLD AUTO: 39 % (ref 35.1–46.5)
HDLC SERPL-MCNC: 2.4 MG/DL (ref 0–5)
HDLC SERPL-MCNC: 67 MG/DL (ref 40–59)
HGB BLD-MCNC: 13.1 G/DL (ref 11.7–15.5)
HGB UR QL STRIP: ABNORMAL
KETONES UR QL STRIP.AUTO: ABNORMAL MG/DL
LDLC SERPL CALC-MCNC: 77 MG/DL (ref 50–99)
LEUKOCYTE ESTERASE: ABNORMAL
LYMPHOCYTES, LYMLT: 27 % (ref 20–45)
MCH RBC QN AUTO: 29 PG (ref 26–34)
MCHC RBC AUTO-ENTMCNC: 34 G/DL (ref 31–36)
MCV RBC AUTO: 87 FL (ref 80–95)
MONOCYTES # BLD: 0.3 K/UL (ref 0.1–1)
MONOCYTES NFR BLD: 6 % (ref 3–12)
NEUTROPHILS # BLD AUTO: 3.3 K/UL (ref 1.8–7.7)
NITRITE UR QL STRIP.AUTO: NEGATIVE
PH UR STRIP: 8 PH (ref 5–8)
PLATELET # BLD AUTO: 269 K/UL (ref 140–440)
PMV BLD AUTO: 10.5 FL (ref 9–13)
POTASSIUM SERPL-SCNC: 4.3 MMOL/L (ref 3.5–5.5)
PROT SERPL-MCNC: 7.2 G/DL (ref 6.4–8.3)
PROT UR QL STRIP: NEGATIVE MG/DL
RBC # BLD AUTO: 4.46 M/UL (ref 3.8–5.2)
RBC #/AREA URNS HPF: ABNORMAL /HPF
SODIUM SERPL-SCNC: 142 MMOL/L (ref 133–145)
SP GR UR: 1.01 (ref 1–1.03)
TRIGL SERPL-MCNC: 78 MG/DL (ref 40–149)
UROBILINOGEN UR STRIP-MCNC: <2 MG/DL
VLDLC SERPL CALC-MCNC: 16 MG/DL (ref 8–30)
WBC # BLD AUTO: 5.3 K/UL (ref 4–11)
WBC URNS QL MICRO: ABNORMAL /HPF (ref 0–2)

## 2019-08-15 RX ORDER — SERTRALINE HYDROCHLORIDE 100 MG/1
TABLET, FILM COATED ORAL
Qty: 180 TAB | Refills: 3 | Status: SHIPPED | OUTPATIENT
Start: 2019-08-15 | End: 2020-08-27

## 2019-08-15 RX ORDER — RIZATRIPTAN BENZOATE 10 MG/1
10 TABLET ORAL
COMMUNITY
End: 2019-10-29 | Stop reason: ALTCHOICE

## 2019-08-15 NOTE — PROGRESS NOTES
Sarah Kate is a 34 y.o. female (: 1990) presenting to address:    Chief Complaint   Patient presents with    Physical       Vitals:    08/15/19 0937   BP: 122/70   Pulse: 85   Resp: 14   Temp: 97.7 °F (36.5 °C)   TempSrc: Oral   SpO2: 97%   Weight: 256 lb 12.8 oz (116.5 kg)   Height: 5' 9\" (1.753 m)   PainSc:   0 - No pain   LMP: 2019       Hearing/Vision:   No exam data present    Learning Assessment:     Learning Assessment 8/15/2019   PRIMARY LEARNER Patient   HIGHEST LEVEL OF EDUCATION - PRIMARY LEARNER  2 YEARS OF COLLEGE   BARRIERS PRIMARY LEARNER -   CO-LEARNER CAREGIVER No   PRIMARY LANGUAGE ENGLISH    NEED No   LEARNER PREFERENCE PRIMARY DEMONSTRATION   ANSWERED BY patient   RELATIONSHIP SELF     Depression Screening:     3 most recent PHQ Screens 10/29/2014   PHQ Not Done Active Diagnosis of Depression or Bipolar Disorder   Little interest or pleasure in doing things Not at all   Feeling down, depressed, irritable, or hopeless Several days   Total Score PHQ 2 1     Fall Risk Assessment:     Fall Risk Assessment, last 12 mths 8/15/2019   Able to walk? Yes   Fall in past 12 months? No     Abuse Screening:     Abuse Screening Questionnaire 8/15/2019   Do you ever feel afraid of your partner? N   Are you in a relationship with someone who physically or mentally threatens you? N   Is it safe for you to go home? Y     Coordination of Care Questionaire:   1. Have you been to the ER, urgent care clinic since your last visit? Hospitalized since your last visit? NO    2. Have you seen or consulted any other health care providers outside of the [address] [place] [address] since your last visit? Include any pap smears or colon screening. YES, neurology    Advanced Directive:   1. Do you have an Advanced Directive? NO    2. Would you like information on Advanced Directives?  NO

## 2019-08-15 NOTE — PATIENT INSTRUCTIONS
Lab today further disposition pending lab results Continue current medications Avoid dietary starch and sugar and follow a program of regular aerobic exercise Anticipatory guidance and recommendations provided verbally and with printed information. Return for annual physical in 1 year, sooner with any problems. Well Visit, Ages 25 to 48: Care Instructions Your Care Instructions Physical exams can help you stay healthy. Your doctor has checked your overall health and may have suggested ways to take good care of yourself. He or she also may have recommended tests. At home, you can help prevent illness with healthy eating, regular exercise, and other steps. Follow-up care is a key part of your treatment and safety. Be sure to make and go to all appointments, and call your doctor if you are having problems. It's also a good idea to know your test results and keep a list of the medicines you take. How can you care for yourself at home? · Reach and stay at a healthy weight. This will lower your risk for many problems, such as obesity, diabetes, heart disease, and high blood pressure. · Get at least 30 minutes of physical activity on most days of the week. Walking is a good choice. You also may want to do other activities, such as running, swimming, cycling, or playing tennis or team sports. Discuss any changes in your exercise program with your doctor. · Do not smoke or allow others to smoke around you. If you need help quitting, talk to your doctor about stop-smoking programs and medicines. These can increase your chances of quitting for good. · Talk to your doctor about whether you have any risk factors for sexually transmitted infections (STIs). Having one sex partner (who does not have STIs and does not have sex with anyone else) is a good way to avoid these infections. · Use birth control if you do not want to have children at this time.  Talk with your doctor about the choices available and what might be best for you. · Protect your skin from too much sun. When you're outdoors from 10 a.m. to 4 p.m., stay in the shade or cover up with clothing and a hat with a wide brim. Wear sunglasses that block UV rays. Even when it's cloudy, put broad-spectrum sunscreen (SPF 30 or higher) on any exposed skin. · See a dentist one or two times a year for checkups and to have your teeth cleaned. · Wear a seat belt in the car. Follow your doctor's advice about when to have certain tests. These tests can spot problems early. For everyone · Cholesterol. Have the fat (cholesterol) in your blood tested after age 21. Your doctor will tell you how often to have this done based on your age, family history, or other things that can increase your risk for heart disease. · Blood pressure. Have your blood pressure checked during a routine doctor visit. Your doctor will tell you how often to check your blood pressure based on your age, your blood pressure results, and other factors. · Vision. Talk with your doctor about how often to have a glaucoma test. 
· Diabetes. Ask your doctor whether you should have tests for diabetes. · Colon cancer. Your risk for colorectal cancer gets higher as you get older. Some experts say that adults should start regular screening at age 48 and stop at age 76. Others say to start before age 48 or continue after age 76. Talk with your doctor about your risk and when to start and stop screening. For women · Breast exam and mammogram. Talk to your doctor about when you should have a clinical breast exam and a mammogram. Medical experts differ on whether and how often women under 50 should have these tests. Your doctor can help you decide what is right for you. · Cervical cancer screening test and pelvic exam. Begin with a Pap test at age 24.  The test often is part of a pelvic exam. Starting at age 27, you may choose to have a Pap test, an HPV test, or both tests at the same time (called co-testing). Talk with your doctor about how often to have testing. · Tests for sexually transmitted infections (STIs). Ask whether you should have tests for STIs. You may be at risk if you have sex with more than one person, especially if your partners do not wear condoms. For men · Tests for sexually transmitted infections (STIs). Ask whether you should have tests for STIs. You may be at risk if you have sex with more than one person, especially if you do not wear a condom. · Testicular cancer exam. Ask your doctor whether you should check your testicles regularly. · Prostate exam. Talk to your doctor about whether you should have a blood test (called a PSA test) for prostate cancer. Experts differ on whether and when men should have this test. Some experts suggest it if you are older than 39 and are -American or have a father or brother who got prostate cancer when he was younger than 72. When should you call for help? Watch closely for changes in your health, and be sure to contact your doctor if you have any problems or symptoms that concern you. Where can you learn more? Go to http://titus-malena.info/. Enter P072 in the search box to learn more about \"Well Visit, Ages 25 to 48: Care Instructions. \" Current as of: December 13, 2018 Content Version: 12.1 © 4068-8772 [employer]. Care instructions adapted under license by [employer] (which disclaims liability or warranty for this information). If you have questions about a medical condition or this instruction, always ask your healthcare professional. [place] [address] any warranty or liability for your use of this information.

## 2019-08-16 NOTE — PROGRESS NOTES
Please advise the patient that lab results were remarkable only for urinalysis which is most likely which would be of concern for urinary tract infection unless the patient is currently experiencing menstrual flow. Please ask and let me know.

## 2020-08-27 DIAGNOSIS — F32.A CHRONIC DEPRESSION: ICD-10-CM

## 2020-08-27 RX ORDER — SERTRALINE HYDROCHLORIDE 100 MG/1
TABLET, FILM COATED ORAL
Qty: 180 TAB | Refills: 0 | Status: SHIPPED | OUTPATIENT
Start: 2020-08-27 | End: 2020-11-29

## 2020-08-27 NOTE — TELEPHONE ENCOUNTER
Please advise, medication refilled for 3 months however the patient has not been seen in 1 year and needs to schedule annual physical exam and follow-up prior to any refills after this

## 2020-08-27 NOTE — TELEPHONE ENCOUNTER
LVM for pt Rx was approved and sent to pharmacy; pt needs to schedule physical exam, no more refills w/o an appt.

## 2020-08-31 NOTE — PROGRESS NOTES
HISTORY OF PRESENT ILLNESS  Trinity Willis is a 27 y.o. female.   She presents for health assessment, preventative care as well as follow-up with history of migraine headaches, depression and severe obesity    Mr#: [id number]      Past Medical History:   Diagnosis Date    Adjustment insomnia 1/25/2018    BMI 33.0-33.9,adult 1/2/14    BMI 39.0-39.9,adult 1/2/2014 4/22/17 - BMI 37.66    BMI 40.0-44.9, adult ([facility] [address] 12/19/2017    Borderline high LDL cholesterol of 126 04/20/2017    10 year CVD risk score 0.3% as of 4/20/17    Depression     H/O echocardiogram 12/5/14    Normal ([facility]), negative bubble study    Headache     Headache(784.0)     Zolmitriptan ineffective, Sumatriptan partially effective    History of pneumonia 11/25/2016    Hx of seizure disorder 18 month old    Seizures stopped after bilateral ureteral transplants    Hydronephrosis     Left facial numbness 11/8/2014    Obstructive sleep apnea (adult) (pediatric)     Mild, AHI 8    Posttraumatic stress disorder        Past Surgical History:   Procedure Laterality Date    HX HEENT  08/2017    tonsilectomy    HX OTHER SURGICAL Bilateral 1992    Bilateral ureteral transplants done at < 2 years       Family History   Problem Relation Age of Onset   [facility] Migra[facility] Mother     Alcohol abuse Mother    [facility] Migra[facility] Sister     Cancer Sister     Alcohol abuse Maternal Aunt     Cancer Maternal Aunt     Migraines Maternal Grandmother     Cancer Maternal Grandmother        Allergies   Allergen Reactions    Gluten Other (comments)     migraine    Corn Hives    Naproxen Hives     Tolerates ibuprofen    Strawberry Hives       Social History     Tobacco Use   Smoking Status Former Smoker    Types: Cigarettes   Smokeless Tobacco Never Used       Social History     Substance and Sexual Activity   Alcohol Use Yes    Comment: on occasion         Patient Active Problem List   Diagnosis Code    Chronic depression F32.9    Migraine without aura or status migrainosus G43.009    Borderline high LDL cholesterol of 126 E78.9    BMI 40.0-44.9, adult ([facility]) Z68.41    Adjustment insomnia F51.02    Severe obesity (BMI 35.0-39. 9) with comorbidity ([facility]) E66.01    Hydronephrosis N13.30         Current Outpatient Medications:     sertraline (ZOLOFT) 100 mg tablet, TAKE 2 TABLETS BY MOUTH EVERY DAY, Disp: 180 Tab, Rfl: 0    magnesium oxide (MAG-OX) 400 mg tablet, Take 400 mg by mouth., Disp: , Rfl:     ondansetron hcl (ZOFRAN, AS HYDROCHLORIDE,) 4 mg tablet, Take 4 mg by mouth every eight (8) hours as needed for Nausea., Disp: , Rfl:     LORazepam (ATIVAN) 1 mg tablet, Take 1 Tab by mouth nightly as needed for Anxiety. Max Daily Amount: 1 mg., Disp: 30 Tab, Rfl: 0    Nebulizer & Compressor machine, Wuse nebulizer with albuterol solution up to every 4 hours if needed for cough, wheezing, Disp: 1 Each, Rfl: 0    albuterol (PROVENTIL VENTOLIN) 2.5 mg /3 mL (0.083 %) nebulizer solution, Use with nebulizer up to every 4 hours as needed, Disp: 24 Each, Rfl: 1    albuterol (PROAIR HFA) 90 mcg/actuation inhaler, Take  by inhalation. , Disp: , Rfl:     acetaminophen (TYLENOL) 325 mg tablet, Take  by mouth every four (4) hours as needed for Pain., Disp: , Rfl:     Biotin 2,500 mcg cap, Take  by mouth., Disp: , Rfl:     cholecalciferol, VITAMIN D3, (VITAMIN D3) 5,000 unit tab tablet, Take  by mouth daily. , Disp: , Rfl:     PNV#16-Iron Fum & PS-FA-OM-3 35-1-200 mg cap, Take  by mouth., Disp: , Rfl:        Review of Systems   Constitutional: Negative for chills, fever and weight loss. Weight gain associated with recent pregnancy   HENT: Negative for congestion, hearing loss and sore throat. Eyes: Negative for blurred vision and double vision. Respiratory: Negative for cough, shortness of breath and wheezing. Cardiovascular: Negative for chest pain, palpitations and leg swelling.    Gastrointestinal: Negative for abdominal pain, blood in stool, constipation, diarrhea, heartburn, melena, nausea and vomiting. Genitourinary: Negative for dysuria and urgency. Musculoskeletal: Negative for joint pain and myalgias. Skin: Negative for itching and rash. Neurological: Positive for headaches ( Stable). Negative for dizziness, tingling, sensory change and focal weakness. Endo/Heme/Allergies: Negative for environmental allergies. She reports frequent episodes of mastitis which have interfered with breast-feeding. Currently she indicates that she is experiencing low milk production. She is interacting with lactose consultants. She reports that her infant is 1 months old. Psychiatric/Behavioral: Positive for depression ( She reports that she continues to do well on Zoloft). Negative for suicidal ideas. The patient is not nervous/anxious and does not have insomnia. Visit Vitals  /76 (BP 1 Location: Right arm, BP Patient Position: Sitting)   Pulse 74   Temp 97 °F (36.1 °C) (Oral)   Resp 18   Ht 5' 9\" (1.753 m)   Wt 287 lb 9.6 oz (130.5 kg)   LMP  (LMP Unknown)   SpO2 98%   BMI 42.47 kg/m²       Physical Exam  Vitals signs and nursing note reviewed. Constitutional:       General: She is not in acute distress. Appearance: Normal appearance. She is obese. She is not ill-appearing. HENT:      Head: Normocephalic. Right Ear: Tympanic membrane, ear canal and external ear normal.      Left Ear: Tympanic membrane, ear canal and external ear normal.      Mouth/Throat:      Mouth: Mucous membranes are moist.      Pharynx: Oropharynx is clear. Eyes:      Extraocular Movements: Extraocular movements intact. Conjunctiva/sclera: Conjunctivae normal.      Pupils: Pupils are equal, round, and reactive to light. Neck:      Musculoskeletal: Neck supple. Vascular: No carotid bruit. Cardiovascular:      Rate and Rhythm: Normal rate and regular rhythm. Heart sounds: Normal heart sounds.    Pulmonary:      Effort: Pulmonary effort is normal. Breath sounds: Normal breath sounds. Abdominal:      Palpations: Abdomen is soft. Tenderness: There is no abdominal tenderness. Musculoskeletal:         General: No deformity. Right lower leg: No edema. Left lower leg: No edema. Skin:     General: Skin is warm and dry. Neurological:      General: No focal deficit present. Mental Status: She is alert and oriented to person, place, and time. Psychiatric:         Mood and Affect: Mood normal.         Behavior: Behavior normal.         ASSESSMENT and PLAN    ICD-10-CM ICD-9-CM    1. Routine adult health maintenance  Z00.00 V70.0 CBC WITH AUTOMATED DIFF      HEMOGLOBIN A1C WITH EAG      LIPID PANEL      METABOLIC PANEL, COMPREHENSIVE      URINALYSIS W/ RFLX MICROSCOPIC      TSH 3RD GENERATION   2. Chronic depression  F32.9 311    3. Migraine without aura and without status migrainosus, not intractable  G43.009 346.10    4. Morbid obesity with BMI of 40.0-44.9, [facility]  E66.01 278.01     Z68.41 V85.41    5.  Needs flu shot  Z23 V04.81 CANCELED: INFLUENZA, INJECTABLE, MDCK, PRESERVATIVE FREE, QUADRIVALENT      CANCELED: INFLUENZA VIRUS VAC QUAD,SPLIT,PRESV FREE SYRINGE IM     Lab today, further disposition pending lab results of indicated  Avoid dietary starch and sugar and follow program of regular aerobic exercise  Continue current medications  Return for annual physical exam and follow-up in 1 year, sooner with any problems

## 2020-09-01 ENCOUNTER — OFFICE VISIT (OUTPATIENT)
Dept: FAMILY MEDICINE CLINIC | Age: 30
End: 2020-09-01

## 2020-09-01 VITALS
TEMPERATURE: 97 F | SYSTOLIC BLOOD PRESSURE: 111 MMHG | HEART RATE: 74 BPM | DIASTOLIC BLOOD PRESSURE: 76 MMHG | WEIGHT: 287.6 LBS | OXYGEN SATURATION: 98 % | RESPIRATION RATE: 18 BRPM | HEIGHT: 69 IN | BODY MASS INDEX: 42.6 KG/M2

## 2020-09-01 DIAGNOSIS — E66.01 MORBID OBESITY WITH BMI OF 40.0-44.9, ADULT (HCC): ICD-10-CM

## 2020-09-01 DIAGNOSIS — Z00.00 ROUTINE ADULT HEALTH MAINTENANCE: Primary | ICD-10-CM

## 2020-09-01 DIAGNOSIS — F32.A CHRONIC DEPRESSION: ICD-10-CM

## 2020-09-01 DIAGNOSIS — Z23 NEEDS FLU SHOT: ICD-10-CM

## 2020-09-01 DIAGNOSIS — G43.009 MIGRAINE WITHOUT AURA AND WITHOUT STATUS MIGRAINOSUS, NOT INTRACTABLE: ICD-10-CM

## 2020-09-01 NOTE — PROGRESS NOTES
Demaris Skiff is a 27 y.o. female (: 1990) presenting to address:    Chief Complaint   Patient presents with    Physical       Vitals:    20 0835   BP: 111/76   Pulse: 74   Resp: 18   Temp: 97 °F (36.1 °C)   TempSrc: Oral   SpO2: 98%   Weight: 287 lb 9.6 oz (130.5 kg)   Height: 5' 9\" (1.753 m)   PainSc:   0 - No pain       Hearing/Vision:   No exam data present    Learning Assessment:     Learning Assessment 8/15/2019   PRIMARY LEARNER Patient   HIGHEST LEVEL OF EDUCATION - PRIMARY LEARNER  2 YEARS OF COLLEGE   BARRIERS PRIMARY LEARNER -   CO-LEARNER CAREGIVER No   PRIMARY LANGUAGE ENGLISH    NEED No   LEARNER PREFERENCE PRIMARY DEMONSTRATION   ANSWERED BY patient   RELATIONSHIP SELF     Depression Screening:     3 most recent PHQ Screens 10/29/2014   PHQ Not Done Active Diagnosis of Depression or Bipolar Disorder   Little interest or pleasure in doing things Not at all   Feeling down, depressed, irritable, or hopeless Several days   Total Score PHQ 2 1     Fall Risk Assessment:     Fall Risk Assessment, last 12 mths 8/15/2019   Able to walk? Yes   Fall in past 12 months? No     Abuse Screening:     Abuse Screening Questionnaire 8/15/2019   Do you ever feel afraid of your partner? N   Are you in a relationship with someone who physically or mentally threatens you? N   Is it safe for you to go home? Y     Coordination of Care Questionaire:   1. Have you been to the ER, urgent care clinic since your last visit? Hospitalized since your last visit? NO    2. Have you seen or consulted any other health [facility] providers outside of the [address] [place] [address] since your last visit? Include any pap smears or colon screening. Yes Dr. Diana Fernandes    Advanced Directive:   1. Do you have an Advanced Directive? NO    2. Would you like information on Advanced Directives?  NO

## 2020-09-01 NOTE — PATIENT INSTRUCTIONS
Lab today, further disposition pending lab results of indicated Avoid dietary starch and sugar and follow program of regular aerobic exercise Continue current medications Return for annual physical exam and follow-up in 1 year, sooner with any problems

## 2020-09-02 LAB
AVG GLU, 10930: 98 MG/DL (ref 91–123)
HBA1C MFR BLD HPLC: 5 % (ref 4.8–5.6)

## 2021-06-03 ENCOUNTER — OFFICE VISIT (OUTPATIENT)
Dept: FAMILY MEDICINE CLINIC | Age: 31
End: 2021-06-03
Payer: COMMERCIAL

## 2021-06-03 VITALS
OXYGEN SATURATION: 97 % | SYSTOLIC BLOOD PRESSURE: 108 MMHG | DIASTOLIC BLOOD PRESSURE: 70 MMHG | HEIGHT: 69 IN | HEART RATE: 76 BPM | BODY MASS INDEX: 40.52 KG/M2 | TEMPERATURE: 98 F | WEIGHT: 273.6 LBS | RESPIRATION RATE: 15 BRPM

## 2021-06-03 DIAGNOSIS — R39.9 UTI SYMPTOMS: Primary | ICD-10-CM

## 2021-06-03 DIAGNOSIS — B37.31 MONILIAL VAGINITIS: ICD-10-CM

## 2021-06-03 DIAGNOSIS — Z00.00 ROUTINE HEALTH MAINTENANCE: ICD-10-CM

## 2021-06-03 LAB
BILIRUB UR QL STRIP: NEGATIVE
GLUCOSE UR-MCNC: NEGATIVE MG/DL
KETONES P FAST UR STRIP-MCNC: NEGATIVE MG/DL
PH UR STRIP: 7 [PH] (ref 4.6–8)
PROT UR QL STRIP: NEGATIVE
SP GR UR STRIP: 1.01 (ref 1–1.03)
UA UROBILINOGEN AMB POC: ABNORMAL (ref 0.2–1)
URINALYSIS CLARITY POC: CLEAR
URINALYSIS COLOR POC: ABNORMAL
URINE BLOOD POC: NEGATIVE
URINE LEUKOCYTES POC: ABNORMAL
URINE NITRITES POC: NEGATIVE

## 2021-06-03 PROCEDURE — 99213 OFFICE O/P EST LOW 20 MIN: CPT | Performed by: FAMILY MEDICINE

## 2021-06-03 PROCEDURE — 81003 URINALYSIS AUTO W/O SCOPE: CPT | Performed by: FAMILY MEDICINE

## 2021-06-03 RX ORDER — FLUCONAZOLE 150 MG/1
150 TABLET ORAL DAILY
Qty: 1 TABLET | Refills: 0 | Status: SHIPPED | OUTPATIENT
Start: 2021-06-03 | End: 2021-06-04

## 2021-06-03 RX ORDER — NITROFURANTOIN 25; 75 MG/1; MG/1
100 CAPSULE ORAL 2 TIMES DAILY
Qty: 14 CAPSULE | Refills: 0 | Status: SHIPPED | OUTPATIENT
Start: 2021-06-03 | End: 2021-06-10

## 2021-06-03 NOTE — PROGRESS NOTES
HISTORY OF PRESENT ILLNESS  Norva Runner is a 27 y.o. female. She reports burning with urination which started this morning. She denies fever, flank pain hematuria urgency or frequency. She has not experienced nausea or vomiting.     Mr#: [id number]      Past Medical History:   Diagnosis Date    Adjustment insomnia 1/25/2018    BMI 33.0-33.9,adult 1/2/14    BMI 39.0-39.9,adult 1/2/2014 4/22/17 - BMI 37.66    BMI 40.0-44.9, adult ([facility] [address] 12/19/2017    Borderline high LDL cholesterol of 126 04/20/2017    10 year CVD risk score 0.3% as of 4/20/17    Depression     H/O echocardiogram 12/5/14    Normal ([facility]), negative bubble study    Headache     Headache(784.0)     Zolmitriptan ineffective, Sumatriptan partially effective    History of pneumonia 11/25/2016    Hx of seizure disorder 18 month old    Seizures stopped after bilateral ureteral transplants    Hydronephrosis     Left facial numbness 11/8/2014    Obstructive sleep apnea (adult) (pediatric)     Mild, AHI 8    Posttraumatic stress disorder        Past Surgical History:   Procedure Laterality Date    HX HEENT  08/2017    tonsilectomy    HX OTHER SURGICAL Bilateral 1992    Bilateral ureteral transplants done at < 2 years       Family History   Problem Relation Age of Onset   [facility] Migraines Mother     Alcohol abuse Mother    [facility] Migraines Sister     Cancer Sister     Alcohol abuse Maternal Aunt     Cancer Maternal Aunt     Migraines Maternal Grandmother     Cancer Maternal Grandmother        Allergies   Allergen Reactions    Gluten Other (comments)     migraine    Corn Hives    Naproxen Hives     Tolerates ibuprofen    Strawberry Hives       Social History     Tobacco Use   Smoking Status Former Smoker    Types: Cigarettes   Smokeless Tobacco Never Used       Social History     Substance and Sexual Activity   Alcohol Use Yes    Comment: on occasion         Patient Active Problem List   Diagnosis Code    Chronic depression F32.9    Migraine without aura or status migrainosus G43.009    Borderline high LDL cholesterol of 126 E78.9    BMI 40.0-44.9, adult ([facility] Z68.41    Adjustment insomnia F51.02    Severe obesity (BMI 35.0-39. 9) with comorbidity ([facility]) E66.01    Hydronephrosis N13.30         Current Outpatient Medications:     erenumab-aooe (Aimovig Autoinjector, 2 Pack,) 70 mg/mL injection, 70 mg by SubCUTAneous route once., Disp: , Rfl:     sertraline (ZOLOFT) 100 mg tablet, TAKE 2 TABLETS BY MOUTH EVERY DAY, Disp: 180 Tab, Rfl: 3    magnesium oxide (MAG-OX) 400 mg tablet, Take 400 mg by mouth., Disp: , Rfl:     ondansetron hcl (ZOFRAN, AS HYDROCHLORIDE,) 4 mg tablet, Take 4 mg by mouth every eight (8) hours as needed for Nausea., Disp: , Rfl:     LORazepam (ATIVAN) 1 mg tablet, Take 1 Tab by mouth nightly as needed for Anxiety. Max Daily Amount: 1 mg., Disp: 30 Tab, Rfl: 0    Nebulizer & Compressor machine, Wuse nebulizer with albuterol solution up to every 4 hours if needed for cough, wheezing, Disp: 1 Each, Rfl: 0    albuterol (PROVENTIL VENTOLIN) 2.5 mg /3 mL (0.083 %) nebulizer solution, Use with nebulizer up to every 4 hours as needed, Disp: 24 Each, Rfl: 1    albuterol (PROAIR HFA) 90 mcg/actuation inhaler, Take  by inhalation. , Disp: , Rfl:     acetaminophen (TYLENOL) 325 mg tablet, Take  by mouth every four (4) hours as needed for Pain., Disp: , Rfl:     Biotin 2,500 mcg cap, Take  by mouth., Disp: , Rfl:     cholecalciferol, VITAMIN D3, (VITAMIN D3) 5,000 unit tab tablet, Take  by mouth daily. , Disp: , Rfl:        Review of Systems   Constitutional: Negative for chills and fever. Gastrointestinal: Negative for nausea and vomiting. Genitourinary: Positive for dysuria. Negative for flank pain, frequency, hematuria and urgency.      .  Visit Vitals  /70 (BP 1 Location: Left upper arm, BP Patient Position: Sitting, BP Cuff Size: Adult long)   Pulse 76   Temp 98 °F (36.7 °C) (Temporal)   Resp 15   Ht 5' 9\" (1.753 m)   Wt 273 lb 9.6 oz (124.1 kg)   LMP 05/25/2021 (Approximate)   SpO2 97%   BMI 40.40 kg/m²       Physical Exam  Vitals and nursing note reviewed. Constitutional:       General: She is not in acute distress. Appearance: Normal appearance. She is well-developed. She is not ill-appearing. Comments: 14 pound intentional weight loss in the past 9 months   HENT:      Head: Normocephalic. Cardiovascular:      Rate and Rhythm: Normal rate. Pulmonary:      Effort: Pulmonary effort is normal.   Abdominal:      Palpations: Abdomen is soft. Tenderness: There is no abdominal tenderness. There is no right CVA tenderness or left CVA tenderness. Musculoskeletal:      Cervical back: Neck supple. Skin:     General: Skin is warm and dry. Neurological:      Mental Status: She is alert and oriented to person, place, and time. Psychiatric:         Behavior: Behavior normal.         ASSESSMENT and PLAN    ICD-10-CM ICD-9-CM    1. UTI symptoms  R39.9 788.99 AMB POC URINALYSIS DIP STICK MANUAL W/O MICRO      CULTURE, URINE      nitrofurantoin, macrocrystal-monohydrate, (Macrobid) 100 mg capsule   2. Monilial vaginitis  B37.3 112.1 fluconazole (DIFLUCAN) 150 mg tablet   Assessment:  Possible cystitis    Health maintenance:  COVID-19 immunization status  Pap smear    Plan:  Begin Macrobid 100 mg twice daily for 7 days  Increase fluid consumption  Will advise if culture shows infection not covered by prescribed antibiotic. Return for new symptoms, worsening symptoms or failure to improve  Prescription for Diflucan 150 mg x 1 provided to be taken only if symptoms of monilial vaginitis occur    Return for lab appointment followed by physical exam appointment in September or sooner with any problems    Akilah Avalos MD      PLEASE NOTE:   This document has been produced using voice recognition software. Unrecognized errors in transcription may be present.

## 2021-06-03 NOTE — PATIENT INSTRUCTIONS
Begin Macrobid 100 mg twice daily for 7 days Increase fluid consumption Will advise if culture shows infection not covered by prescribed antibiotic. Return for new symptoms, worsening symptoms or failure to improve Return for lab appointment followed by physical exam appointment in September or sooner with any problems

## 2021-06-05 LAB — RESULT: NORMAL

## 2021-12-17 DIAGNOSIS — F32.A CHRONIC DEPRESSION: ICD-10-CM

## 2021-12-17 RX ORDER — SERTRALINE HYDROCHLORIDE 100 MG/1
TABLET, FILM COATED ORAL
Qty: 180 TABLET | Refills: 1 | Status: SHIPPED | OUTPATIENT
Start: 2021-12-17